# Patient Record
Sex: FEMALE | Race: BLACK OR AFRICAN AMERICAN | Employment: UNEMPLOYED | URBAN - METROPOLITAN AREA
[De-identification: names, ages, dates, MRNs, and addresses within clinical notes are randomized per-mention and may not be internally consistent; named-entity substitution may affect disease eponyms.]

---

## 2017-01-20 LAB
ANTIBODY SCREEN, EXTERNAL: NEGATIVE
CHLAMYDIA, EXTERNAL: NEGATIVE
HBSAG, EXTERNAL: NEGATIVE
HIV, EXTERNAL: NEGATIVE
N. GONORRHEA, EXTERNAL: NEGATIVE
RPR, EXTERNAL: NONREACTIVE
RUBELLA, EXTERNAL: NORMAL
TYPE, ABO & RH, EXTERNAL: NORMAL

## 2017-03-11 ENCOUNTER — APPOINTMENT (OUTPATIENT)
Dept: ULTRASOUND IMAGING | Age: 37
End: 2017-03-11
Attending: EMERGENCY MEDICINE
Payer: COMMERCIAL

## 2017-03-11 ENCOUNTER — HOSPITAL ENCOUNTER (EMERGENCY)
Age: 37
Discharge: HOME | End: 2017-03-11
Attending: EMERGENCY MEDICINE
Payer: COMMERCIAL

## 2017-03-11 VITALS
HEART RATE: 89 BPM | HEIGHT: 61 IN | RESPIRATION RATE: 17 BRPM | DIASTOLIC BLOOD PRESSURE: 60 MMHG | WEIGHT: 157 LBS | BODY MASS INDEX: 29.64 KG/M2 | OXYGEN SATURATION: 99 % | TEMPERATURE: 98.5 F | SYSTOLIC BLOOD PRESSURE: 111 MMHG

## 2017-03-11 DIAGNOSIS — N76.0 BV (BACTERIAL VAGINOSIS): Primary | ICD-10-CM

## 2017-03-11 DIAGNOSIS — B96.89 BV (BACTERIAL VAGINOSIS): Primary | ICD-10-CM

## 2017-03-11 LAB
ALBUMIN SERPL BCP-MCNC: 3 G/DL (ref 3.5–5)
ALBUMIN/GLOB SERPL: 0.7 {RATIO} (ref 1.1–2.2)
ALP SERPL-CCNC: 99 U/L (ref 45–117)
ALT SERPL-CCNC: 21 U/L (ref 12–78)
ANION GAP BLD CALC-SCNC: 9 MMOL/L (ref 5–15)
APPEARANCE UR: ABNORMAL
AST SERPL W P-5'-P-CCNC: 14 U/L (ref 15–37)
BACTERIA URNS QL MICRO: NEGATIVE /HPF
BASOPHILS # BLD AUTO: 0 K/UL (ref 0–0.1)
BASOPHILS # BLD: 0 % (ref 0–1)
BILIRUB SERPL-MCNC: 0.1 MG/DL (ref 0.2–1)
BILIRUB UR QL: NEGATIVE
BUN SERPL-MCNC: 7 MG/DL (ref 6–20)
BUN/CREAT SERPL: 14 (ref 12–20)
CALCIUM SERPL-MCNC: 9.1 MG/DL (ref 8.5–10.1)
CHLORIDE SERPL-SCNC: 103 MMOL/L (ref 97–108)
CLUE CELLS VAG QL WET PREP: NORMAL
CO2 SERPL-SCNC: 24 MMOL/L (ref 21–32)
COLOR UR: ABNORMAL
CREAT SERPL-MCNC: 0.5 MG/DL (ref 0.55–1.02)
EOSINOPHIL # BLD: 0.3 K/UL (ref 0–0.4)
EOSINOPHIL NFR BLD: 3 % (ref 0–7)
EPITH CASTS URNS QL MICRO: ABNORMAL /LPF
ERYTHROCYTE [DISTWIDTH] IN BLOOD BY AUTOMATED COUNT: 14.7 % (ref 11.5–14.5)
GLOBULIN SER CALC-MCNC: 4.3 G/DL (ref 2–4)
GLUCOSE SERPL-MCNC: 93 MG/DL (ref 65–100)
GLUCOSE UR STRIP.AUTO-MCNC: NEGATIVE MG/DL
HCG SERPL-ACNC: ABNORMAL MIU/ML (ref 0–6)
HCT VFR BLD AUTO: 38.1 % (ref 35–47)
HGB BLD-MCNC: 12.2 G/DL (ref 11.5–16)
HGB UR QL STRIP: NEGATIVE
HYALINE CASTS URNS QL MICRO: ABNORMAL /LPF (ref 0–5)
KETONES UR QL STRIP.AUTO: NEGATIVE MG/DL
KOH PREP SPEC: NORMAL
LEUKOCYTE ESTERASE UR QL STRIP.AUTO: NEGATIVE
LYMPHOCYTES # BLD AUTO: 21 % (ref 12–49)
LYMPHOCYTES # BLD: 1.6 K/UL (ref 0.8–3.5)
MCH RBC QN AUTO: 25.6 PG (ref 26–34)
MCHC RBC AUTO-ENTMCNC: 32 G/DL (ref 30–36.5)
MCV RBC AUTO: 80 FL (ref 80–99)
MONOCYTES # BLD: 0.4 K/UL (ref 0–1)
MONOCYTES NFR BLD AUTO: 5 % (ref 5–13)
NEUTS SEG # BLD: 5.6 K/UL (ref 1.8–8)
NEUTS SEG NFR BLD AUTO: 71 % (ref 32–75)
NITRITE UR QL STRIP.AUTO: NEGATIVE
PH UR STRIP: 6 [PH] (ref 5–8)
PLATELET # BLD AUTO: 289 K/UL (ref 150–400)
POTASSIUM SERPL-SCNC: 3.4 MMOL/L (ref 3.5–5.1)
PROT SERPL-MCNC: 7.3 G/DL (ref 6.4–8.2)
PROT UR STRIP-MCNC: NEGATIVE MG/DL
RBC # BLD AUTO: 4.76 M/UL (ref 3.8–5.2)
RBC #/AREA URNS HPF: ABNORMAL /HPF (ref 0–5)
SERVICE CMNT-IMP: NORMAL
SODIUM SERPL-SCNC: 136 MMOL/L (ref 136–145)
SP GR UR REFRACTOMETRY: 1.02 (ref 1–1.03)
T VAGINALIS VAG QL WET PREP: NORMAL
UROBILINOGEN UR QL STRIP.AUTO: 1 EU/DL (ref 0.2–1)
WBC # BLD AUTO: 8 K/UL (ref 3.6–11)
WBC URNS QL MICRO: ABNORMAL /HPF (ref 0–4)

## 2017-03-11 PROCEDURE — 87210 SMEAR WET MOUNT SALINE/INK: CPT | Performed by: EMERGENCY MEDICINE

## 2017-03-11 PROCEDURE — 36415 COLL VENOUS BLD VENIPUNCTURE: CPT | Performed by: EMERGENCY MEDICINE

## 2017-03-11 PROCEDURE — 84702 CHORIONIC GONADOTROPIN TEST: CPT | Performed by: EMERGENCY MEDICINE

## 2017-03-11 PROCEDURE — 81001 URINALYSIS AUTO W/SCOPE: CPT | Performed by: EMERGENCY MEDICINE

## 2017-03-11 PROCEDURE — 76802 OB US < 14 WKS ADDL FETUS: CPT

## 2017-03-11 PROCEDURE — 80053 COMPREHEN METABOLIC PANEL: CPT | Performed by: EMERGENCY MEDICINE

## 2017-03-11 PROCEDURE — 99283 EMERGENCY DEPT VISIT LOW MDM: CPT

## 2017-03-11 PROCEDURE — 85025 COMPLETE CBC W/AUTO DIFF WBC: CPT | Performed by: EMERGENCY MEDICINE

## 2017-03-11 RX ORDER — CLINDAMYCIN HYDROCHLORIDE 300 MG/1
300 CAPSULE ORAL 2 TIMES DAILY
Qty: 14 CAP | Refills: 0 | Status: SHIPPED | OUTPATIENT
Start: 2017-03-11 | End: 2017-03-18

## 2017-03-11 NOTE — ED PROVIDER NOTES
HPI Comments: 39 y.o. female with past medical history significant for headache who presents from home with chief complaint of a pregnancy problem. Pt reports she has been having lower abdominal cramping with accompanying vaginal discharge for the last 2 days, and she is concerned about her pregnancy d/t prior complications. She states she is unsure whether her discharge is amniotic fluid or urine. Pt denies contractions. There are no other acute medical concerns at this time. Note written by Austin Garber, as dictated by Junior Ko MD 1:34 PM    The history is provided by the patient. The history is limited by a developmental delay. Past Medical History:   Diagnosis Date    Headache        Past Surgical History:   Procedure Laterality Date    HX  SECTION           Family History:   Problem Relation Age of Onset    Hypertension Mother     Diabetes Maternal Aunt     Hypertension Maternal Aunt     Diabetes Paternal Aunt     Hypertension Paternal Aunt     Diabetes Paternal Grandfather        Social History     Social History    Marital status: SINGLE     Spouse name: N/A    Number of children: N/A    Years of education: N/A     Occupational History    Not on file. Social History Main Topics    Smoking status: Never Smoker    Smokeless tobacco: Never Used    Alcohol use 0.6 oz/week     1 Glasses of wine per week    Drug use: No    Sexual activity: Not Currently     Birth control/ protection: None     Other Topics Concern    Not on file     Social History Narrative         ALLERGIES: Review of patient's allergies indicates no known allergies. Review of Systems   Constitutional: Negative for chills, diaphoresis and fever. HENT: Negative for congestion, postnasal drip, rhinorrhea and sore throat. Eyes: Negative for photophobia, discharge, redness and visual disturbance. Respiratory: Negative for cough, chest tightness, shortness of breath and wheezing. Cardiovascular: Negative for chest pain, palpitations and leg swelling. Gastrointestinal: Positive for abdominal pain. Negative for abdominal distention, blood in stool, constipation, diarrhea, nausea and vomiting. Genitourinary: Positive for vaginal discharge. Negative for difficulty urinating, dysuria, frequency, hematuria and urgency. Musculoskeletal: Negative for arthralgias, back pain, joint swelling and myalgias. Skin: Negative for color change and rash. Neurological: Negative for dizziness, speech difficulty, weakness, light-headedness, numbness and headaches. Psychiatric/Behavioral: Negative for confusion. The patient is not nervous/anxious. All other systems reviewed and are negative. Vitals:    03/11/17 1236   BP: 106/68   Pulse: 100   Resp: 18   Temp: 98.4 °F (36.9 °C)   SpO2: 97%   Weight: 71.2 kg (157 lb)   Height: 5' 1\" (1.549 m)            Physical Exam   Constitutional: She is oriented to person, place, and time. She appears well-developed and well-nourished. No distress. HENT:   Head: Normocephalic and atraumatic. Right Ear: External ear normal.   Left Ear: External ear normal.   Nose: Nose normal.   Mouth/Throat: Oropharynx is clear and moist.   Eyes: Conjunctivae and EOM are normal. Pupils are equal, round, and reactive to light. No scleral icterus. Neck: Normal range of motion. Neck supple. No JVD present. No tracheal deviation present. No thyromegaly present. Cardiovascular: Normal rate, regular rhythm and normal heart sounds. Exam reveals no gallop and no friction rub. No murmur heard. Pulmonary/Chest: Effort normal and breath sounds normal. No respiratory distress. She has no wheezes. She has no rales. She exhibits no tenderness. Abdominal: Soft. Bowel sounds are normal. She exhibits no distension and no mass. There is no tenderness. There is no rebound and no guarding. Genitourinary:   Genitourinary Comments: Pelvic exam: cervix is closed.  Foul smelling white discharge present. Musculoskeletal: Normal range of motion. She exhibits no edema or tenderness. Lymphadenopathy:     She has no cervical adenopathy. Neurological: She is alert and oriented to person, place, and time. She has normal strength. She displays no atrophy and no tremor. No cranial nerve deficit. She exhibits normal muscle tone. Coordination and gait normal.   Skin: Skin is warm and dry. No rash noted. She is not diaphoretic. No erythema. Psychiatric: She has a normal mood and affect. Her behavior is normal. Judgment and thought content normal.   Nursing note and vitals reviewed. Note written by Austin Salguero, as dictated by Kassandra Durand MD 1:34 PM    MDM  Number of Diagnoses or Management Options  Diagnosis management comments: Impression: 68-year-old female proximally 14 weeks gravid with her second pregnancy presenting to the emergency department for sensation that she's been leaking amniotic fluid. There is been no fever chills abdominal pain or contractions. No vaginal discharge. Differential includes possible urine leak, urinary tract infection, doubt amniotic fluid leak, consider vaginal discharge. Plan of care B. baseline labs, pelvic ultrasound, pelvic exam and we'll send swabs for evaluation. We'll treat accordingly.     ED Course       Procedures

## 2017-03-11 NOTE — ED TRIAGE NOTES
Pt presents with complaints of cramping and discomfort in lower abdomen. Pt states 2 days ago she began to have \"leakage\" of clear fluid. Pt is concerned that this may be amniotic fluid. Pt reports spotting bright red blood 1 week ago.

## 2017-03-11 NOTE — DISCHARGE INSTRUCTIONS
We hope that we have addressed all of your medical concerns. The examination and treatment you received in the Emergency Department were for an emergent problem and were not intended as complete care. It is important that you follow up with your healthcare provider(s) for ongoing care. If your symptoms worsen or do not improve as expected, and you are unable to reach your usual health care provider(s), you should return to the Emergency Department. Today's healthcare is undergoing tremendous change, and patient satisfaction surveys are one of the many tools to assess the quality of medical care. You may receive a survey from the [employer] regarding your experience in the Emergency Department. I hope that your experience has been completely positive, particularly the medical care that I provided. As such, please participate in the survey; anything less than excellent does not meet my expectations or intentions. [facility] and [address] [place] [address] participate in nationally recognized quality of care measures. If your blood pressure is greater than 120/80, as reported below, we urge that you seek medical care to address the potential of high blood pressure, commonly known as hypertension. Hypertension can be hereditary or can be caused by certain medical conditions, pain, stress, or \"white coat syndrome. \"       Please make an appointment with your health care provider(s) for follow up of your Emergency Department visit. VITALS:   Patient Vitals for the past 8 hrs:   Temp Pulse Resp BP SpO2   03/11/17 1236 98.4 °F (36.9 °C) 100 18 106/68 97 %          Thank you for allowing us to provide you with medical care today. We realize that you have many choices for your emergency care needs. Please choose us in the future for any continued health care needs. Lisa Conklin M.D.  [facility].   Office: [phone]            Recent Results (from the past 24 hour(s))   CBC WITH AUTOMATED DIFF    Collection Time: 03/11/17  1:00 PM   Result Value Ref Range    WBC 8.0 3.6 - 11.0 K/uL    RBC 4.76 3.80 - 5.20 M/uL    HGB 12.2 11.5 - 16.0 g/dL    HCT 38.1 35.0 - 47.0 %    MCV 80.0 80.0 - 99.0 FL    MCH 25.6 (L) 26.0 - 34.0 PG    MCHC 32.0 30.0 - 36.5 g/dL    RDW 14.7 (H) 11.5 - 14.5 %    PLATELET 591 554 - 115 K/uL    NEUTROPHILS 71 32 - 75 %    LYMPHOCYTES 21 12 - 49 %    MONOCYTES 5 5 - 13 %    EOSINOPHILS 3 0 - 7 %    BASOPHILS 0 0 - 1 %    ABS. NEUTROPHILS 5.6 1.8 - 8.0 K/UL    ABS. LYMPHOCYTES 1.6 0.8 - 3.5 K/UL    ABS. MONOCYTES 0.4 0.0 - 1.0 K/UL    ABS. EOSINOPHILS 0.3 0.0 - 0.4 K/UL    ABS. BASOPHILS 0.0 0.0 - 0.1 K/UL   METABOLIC PANEL, COMPREHENSIVE    Collection Time: 03/11/17  1:00 PM   Result Value Ref Range    Sodium 136 136 - 145 mmol/L    Potassium 3.4 (L) 3.5 - 5.1 mmol/L    Chloride 103 97 - 108 mmol/L    CO2 24 21 - 32 mmol/L    Anion gap 9 5 - 15 mmol/L    Glucose 93 65 - 100 mg/dL    BUN 7 6 - 20 MG/DL    Creatinine 0.50 (L) 0.55 - 1.02 MG/DL    BUN/Creatinine ratio 14 12 - 20      GFR est AA >60 >60 ml/min/1.73m2    GFR est non-AA >60 >60 ml/min/1.73m2    Calcium 9.1 8.5 - 10.1 MG/DL    Bilirubin, total 0.1 (L) 0.2 - 1.0 MG/DL    ALT (SGPT) 21 12 - 78 U/L    AST (SGOT) 14 (L) 15 - 37 U/L    Alk. phosphatase 99 45 - 117 U/L    Protein, total 7.3 6.4 - 8.2 g/dL    Albumin 3.0 (L) 3.5 - 5.0 g/dL    Globulin 4.3 (H) 2.0 - 4.0 g/dL    A-G Ratio 0.7 (L) 1.1 - 2.2     TOTAL HCG, QT.     Collection Time: 03/11/17  1:00 PM   Result Value Ref [place] [address] (H) 0 - 6 MIU/ML   URINALYSIS W/MICROSCOPIC    Collection Time: 03/11/17  1:00 PM   Result Value Ref Range    Color YELLOW/STRAW      Appearance CLOUDY (A) CLEAR      Specific gravity 1.021 1.003 - 1.030      pH (UA) 6.0 5.0 - 8.0      Protein NEGATIVE  NEG mg/dL    Glucose NEGATIVE  NEG mg/dL    Ketone NEGATIVE NEG mg/dL    Bilirubin NEGATIVE  NEG      Blood NEGATIVE  NEG      Urobilinogen 1.0 0.2 - 1.0 EU/dL    Nitrites NEGATIVE  NEG      Leukocyte Esterase NEGATIVE  NEG      WBC 0-4 0 - 4 /hpf    RBC 0-5 0 - 5 /hpf    Epithelial cells FEW FEW /lpf    Bacteria NEGATIVE  NEG /hpf    Hyaline cast 0-2 0 - 5 /lpf   GISSELL, OTHER SOURCES    Collection Time: 03/11/17  2:10 PM   Result Value Ref Range    Special Requests: NO SPECIAL REQUESTS      KOH NO YEAST SEEN     WET PREP    Collection Time: 03/11/17  2:10 PM   Result Value Ref Range    Clue cells CLUE CELLS PRESENT      Wet prep NO TRICHOMONAS SEEN         Us Preg Uts < 14 Wks Add    Result Date: 3/11/2017  INDICATION:  leaking fluid? EXAM: PELVIC ULTRASOUND FOR EARLY PREGNANCY. COMPARISON: None. TECHNIQUE: Realtime sonography of the pelvis was performed transabdominally with multiple static images obtained. Kary Laureano FINDINGS: TRANSABDOMINAL: UTERINE SIZE IS enlarged . The cervix measures 3.9 cm in length. The examination demonstrates a single intrauterine pregnancy with gestational size parameters as follows: Biparietal diameter 2.7 cm, gestational age 12 weeks 5 days, head circumference 9.1 cm gestational age 12 weeks 1 day, abdominal circumference 8.0 cm estimated gestational age 12 weeks 3 days, femur length 1.56 cm estimated gestational age 12 weeks 4 days. . Fetal cardiac activity is identified with a fetal heart rate of 153 beats per minute. The internal cervical os is closed. AMNIOTIC FLUID volume is normal. The PLACENTA  is located posteriorly and may be near the cervix at this point. . The ovaries are not seen due to uterine enlargement and overlying bowel gas. . There is no free fluid in the cul-de-sac. IMPRESSION: Single viable 14 week 3 day intrauterine pregnancy. Amniotic fluid volume is normal.              Bacterial Vaginosis: Care Instructions  Your Care Instructions    Bacterial vaginosis is a type of vaginal infection.  It is caused by excess growth of certain bacteria that are normally found in the vagina. Symptoms can include itching, swelling, pain when you urinate or have sex, and a gray or yellow discharge with a \"fishy\" odor. It is not considered an infection that is spread through sexual contact. Although symptoms can be annoying and uncomfortable, bacterial vaginosis does not usually cause other health problems. However, if you have it while you are pregnant, it can cause complications. While the infection may go away on its own, most doctors use antibiotics to treat it. You may have been prescribed pills or vaginal cream. With treatment, bacterial vaginosis usually clears up in 5 to 7 days. Follow-up care is a key part of your treatment and safety. Be sure to make and go to all appointments, and call your doctor if you are having problems. It's also a good idea to know your test results and keep a list of the medicines you take. How can you care for yourself at home? · Take your antibiotics as directed. Do not stop taking them just because you feel better. You need to take the full course of antibiotics. · Do not eat or drink anything that contains alcohol if you are taking metronidazole (Flagyl). · Keep using your medicine if you start your period. Use pads instead of tampons while using a vaginal cream or suppository. Tampons can absorb the medicine. · Wear loose cotton clothing. Do not wear nylon and other materials that hold body heat and moisture close to the skin. · Do not scratch. Relieve itching with a cold pack or a cool bath. · Do not wash your vaginal area more than once a day. Use plain water or a mild, unscented soap. Do not douche. When should you call for help? Watch closely for changes in your health, and be sure to contact your doctor if:  · You have unexpected vaginal bleeding. · You have a fever. · You have new or increased pain in your vagina or pelvis. · You are not getting better after 1 week.   · Your symptoms return after you finish the course of your medicine. Where can you learn more? Go to http://hafsa-ary.info/. Verlene Sacks in the search box to learn more about \"Bacterial Vaginosis: Care Instructions. \"  Current as of: February 25, 2016  Content Version: 11.1  © 6022-4760 [employer]. Care instructions adapted under license by [employer] (which disclaims liability or warranty for this information). If you have questions about a medical condition or this instruction, always ask your healthcare professional. [address] any warranty or liability for your use of this information.

## 2017-06-22 ENCOUNTER — HOSPITAL ENCOUNTER (EMERGENCY)
Age: 37
Discharge: HOME | End: 2017-06-22
Attending: OBSTETRICS & GYNECOLOGY | Admitting: OBSTETRICS & GYNECOLOGY
Payer: COMMERCIAL

## 2017-06-22 ENCOUNTER — HOSPITAL ENCOUNTER (EMERGENCY)
Age: 37
Discharge: STILL ADMITTED OR ADMINISTRATIVE | End: 2017-06-22
Attending: EMERGENCY MEDICINE
Payer: COMMERCIAL

## 2017-06-22 VITALS
DIASTOLIC BLOOD PRESSURE: 61 MMHG | RESPIRATION RATE: 16 BRPM | OXYGEN SATURATION: 94 % | TEMPERATURE: 97.7 F | HEART RATE: 87 BPM | HEIGHT: 61 IN | BODY MASS INDEX: 34.55 KG/M2 | SYSTOLIC BLOOD PRESSURE: 117 MMHG | WEIGHT: 183 LBS

## 2017-06-22 LAB
ALBUMIN SERPL BCP-MCNC: 2.7 G/DL (ref 3.5–5)
ALBUMIN/GLOB SERPL: 0.7 {RATIO} (ref 1.1–2.2)
ALP SERPL-CCNC: 120 U/L (ref 45–117)
ALT SERPL-CCNC: 16 U/L (ref 12–78)
ANION GAP BLD CALC-SCNC: 11 MMOL/L (ref 5–15)
APPEARANCE UR: ABNORMAL
AST SERPL W P-5'-P-CCNC: 16 U/L (ref 15–37)
BACTERIA URNS QL MICRO: NEGATIVE /HPF
BASOPHILS # BLD AUTO: 0 K/UL (ref 0–0.1)
BASOPHILS # BLD: 0 % (ref 0–1)
BILIRUB SERPL-MCNC: 0.2 MG/DL (ref 0.2–1)
BILIRUB UR QL: NEGATIVE
BUN SERPL-MCNC: 6 MG/DL (ref 6–20)
BUN/CREAT SERPL: 12 (ref 12–20)
CALCIUM SERPL-MCNC: 9.1 MG/DL (ref 8.5–10.1)
CHLORIDE SERPL-SCNC: 107 MMOL/L (ref 97–108)
CO2 SERPL-SCNC: 24 MMOL/L (ref 21–32)
COLOR UR: ABNORMAL
CREAT SERPL-MCNC: 0.5 MG/DL (ref 0.55–1.02)
EOSINOPHIL # BLD: 0.2 K/UL (ref 0–0.4)
EOSINOPHIL NFR BLD: 2 % (ref 0–7)
EPITH CASTS URNS QL MICRO: ABNORMAL /LPF
ERYTHROCYTE [DISTWIDTH] IN BLOOD BY AUTOMATED COUNT: 15.4 % (ref 11.5–14.5)
GLOBULIN SER CALC-MCNC: 3.8 G/DL (ref 2–4)
GLUCOSE SERPL-MCNC: 95 MG/DL (ref 65–100)
GLUCOSE UR STRIP.AUTO-MCNC: NEGATIVE MG/DL
HCT VFR BLD AUTO: 34.8 % (ref 35–47)
HGB BLD-MCNC: 11.3 G/DL (ref 11.5–16)
HGB UR QL STRIP: ABNORMAL
HYALINE CASTS URNS QL MICRO: ABNORMAL /LPF (ref 0–5)
KETONES UR QL STRIP.AUTO: NEGATIVE MG/DL
LEUKOCYTE ESTERASE UR QL STRIP.AUTO: NEGATIVE
LIPASE SERPL-CCNC: 192 U/L (ref 73–393)
LYMPHOCYTES # BLD AUTO: 22 % (ref 12–49)
LYMPHOCYTES # BLD: 2.3 K/UL (ref 0.8–3.5)
MCH RBC QN AUTO: 27.2 PG (ref 26–34)
MCHC RBC AUTO-ENTMCNC: 32.5 G/DL (ref 30–36.5)
MCV RBC AUTO: 83.7 FL (ref 80–99)
MONOCYTES # BLD: 0.5 K/UL (ref 0–1)
MONOCYTES NFR BLD AUTO: 4 % (ref 5–13)
NEUTS SEG # BLD: 7.5 K/UL (ref 1.8–8)
NEUTS SEG NFR BLD AUTO: 72 % (ref 32–75)
NITRITE UR QL STRIP.AUTO: NEGATIVE
PH UR STRIP: 7 [PH] (ref 5–8)
PLATELET # BLD AUTO: 307 K/UL (ref 150–400)
POTASSIUM SERPL-SCNC: 3.6 MMOL/L (ref 3.5–5.1)
PROT SERPL-MCNC: 6.5 G/DL (ref 6.4–8.2)
PROT UR STRIP-MCNC: NEGATIVE MG/DL
RBC # BLD AUTO: 4.16 M/UL (ref 3.8–5.2)
RBC #/AREA URNS HPF: ABNORMAL /HPF (ref 0–5)
SODIUM SERPL-SCNC: 142 MMOL/L (ref 136–145)
SP GR UR REFRACTOMETRY: 1.01 (ref 1–1.03)
UA: UC IF INDICATED,UAUC: ABNORMAL
UROBILINOGEN UR QL STRIP.AUTO: 0.2 EU/DL (ref 0.2–1)
WBC # BLD AUTO: 10.5 K/UL (ref 3.6–11)
WBC URNS QL MICRO: ABNORMAL /HPF (ref 0–4)

## 2017-06-22 PROCEDURE — 36415 COLL VENOUS BLD VENIPUNCTURE: CPT | Performed by: OBSTETRICS & GYNECOLOGY

## 2017-06-22 PROCEDURE — 75810000275 HC EMERGENCY DEPT VISIT NO LEVEL OF CARE

## 2017-06-22 PROCEDURE — 85025 COMPLETE CBC W/AUTO DIFF WBC: CPT | Performed by: OBSTETRICS & GYNECOLOGY

## 2017-06-22 PROCEDURE — 99285 EMERGENCY DEPT VISIT HI MDM: CPT

## 2017-06-22 PROCEDURE — 81001 URINALYSIS AUTO W/SCOPE: CPT | Performed by: OBSTETRICS & GYNECOLOGY

## 2017-06-22 PROCEDURE — 80053 COMPREHEN METABOLIC PANEL: CPT | Performed by: OBSTETRICS & GYNECOLOGY

## 2017-06-22 PROCEDURE — 83690 ASSAY OF LIPASE: CPT | Performed by: OBSTETRICS & GYNECOLOGY

## 2017-06-22 RX ORDER — TERBUTALINE SULFATE 1 MG/ML
0.25 INJECTION SUBCUTANEOUS AS NEEDED
Status: DISCONTINUED | OUTPATIENT
Start: 2017-06-22 | End: 2017-06-23 | Stop reason: HOSPADM

## 2017-06-22 NOTE — PROGRESS NOTES
1958 Bedside and Verbal shift change report given to SKYLER Greene RN  (oncoming nurse) by Dari Huerta RN  (offgoing nurse). Report included the following information SBAR, Kardex, Procedure Summary, Intake/Output, MAR and Recent Results. 2020 Dari Huerta RN called Dr. Jose Alfredo Otto and notified her of patient's arrival. Patient to be seen by Dr. Delroy Hollins. 2115 Dr. Delroy Hollins at bedside assessing patient. Reviewed fetal monitor strip. 2130  Dr. Delroy Hollins remaining at bedside evaluating the patient. Plan to send labs. All questions addressed at this time. 2135 Labs drawn and sent to lab. 2238 Dr. Delroy Hollins at bedside discussing normal lab results. Plan to send patient home with instructions to keep scheduled appoint on 6/28 and call doctor's office if vaginal bleeding, leaking of fluid, or any change of symptoms. [phone] Discharge instructions printed, reviewed with patient and given to patient to take home. All questions addressed at this time. 2258 Patient discharged to home ambulatory with family members.

## 2017-06-22 NOTE — IP AVS SNAPSHOT
[address] 
[phone] Patient: Maico Taylor MRN: [id number] NVL:[id number] You are allergic to the following No active allergies Recent Documentation Height Weight Breastfeeding? BMI OB Status Smoking Status 1.549 m 83 kg No 34.58 kg/m2 Pregnant Never Smoker Emergency Contacts Name Discharge Info Relation Home Work Mobile Jael Saldana DISCHARGE CAREGIVER [3] Father [15] [phone] About your hospitalization You were admitted on:  N/A You last received care in the:  [facility] 3 LABOR & DELIVERY You were discharged on:  June 22, 2017 Unit phone number:  [phone] Why you were hospitalized Your primary diagnosis was:  Not on File Providers Seen During Your Hospitalizations Provider Role Specialty Primary office phone Maikol Bahena DO Attending Provider Obstetrics & Gynecology [phone] Your Primary Care Physician (PCP) Primary Care Physician Office Phone Office Fax OTHER, PHYS ** None ** ** None ** Follow-up Information Follow up With Details Comments Contact Info Maikol Bahena DO Go to scheduled appointment on 6/28/17 [facility] 201 1400 [address] [place] [address] 
[phone] Current Discharge Medication List  
  
ASK your doctor about these medications Dose & Instructions Dispensing Information Comments Morning Noon Evening Bedtime  
 cyanocobalamin 500 mcg tablet Commonly known as:  VITAMIN B12 Your last dose was: Your next dose is:    
   
   
 Dose:  500 mcg Take 500 mcg by mouth daily. Refills:  0  
     
   
   
   
  
 multivitamin tablet Commonly known as:  ONE A DAY Your last dose was: Your next dose is:    
   
   
 Dose:  1 Tab Take 1 Tab by mouth daily. Refills:  0 PNV No12-Iron-FA-DSS-OM-3 29 mg iron-1 mg -50 mg Cpkd Your last dose was: Your next dose is: Take  by mouth. Refills:  0 Discharge Instructions  DISCHARGE INSTRUCTIONS Name: Vicky Webb YOB: 1980 Primary Diagnosis: Active Problems: * No active hospital problems. * Introduction: You have visited the hospital because you thought you were in  labor. These guidelines are for your information at home to help prevent repeated problems. In general, you should remember: 
? Empty your bladder every 2-3 hours. ? Avoid breast stimulation (including showers where the water stream is on your breasts)-this can cause contractions. ? Rest means lying down. ? Contractions and cramping happen more often in evening and nighttime. ? No intercourse or sexual stimulation without asking your doctor. ? Try to arrange for help with housework and . General:  
 
None Diet/Diet Restrictions: Follow your regularly prescribed diet Physical Activity / Restrictions / Safety:  
 
* Activity at home is based on how strong your  labor has been, You should follow the following activity guidelines. Activity based on symptoms: Lie down on your side if you feel contractions or tightening in your abdomen. Discharge Instructions/ Special Treatment/ Home Care Needs:  
 
Call your provider if: 
? Uterine cramping (menstrual-like cramps, intermittent or constant ? Uterine contractions every 10-15 minutes or more frequently ? Low abdominal pressure ( pelvic pressure) ? Dull low backache (intermittent or constant) ? Increase or change in vaginal discharge ? Feeling that the baby is \"pushing down\" ? Abdominal cramping with or without diarrhea If any of these symptoms are experienced, stop what you are doing, lie down on your side, drink two to three glasses of water and wait one hour. If the symptoms persist or get worse, call your provider. Pain Management:  
 
 
 
 
Signed By: Juvenal Bennett RN                                                                                                   Date: 6/22/2017 Time: 10:40 PM 
 
Discharge Checklist-NURSING TO COMPLETE:  
 
Date and Time of Discharge: Date: 6/22/2017 Time: 10:40 PM 
 
Return of:  
Dental Appliance: Dental Appliances: None Vision: Visual Aid: None Hearing Aid:   
Jewelry: Jewelry: None Clothing: Clothing: At bedside Other Valuables: Other Valuables: Cell Phone, Silvio Quinn, [address] Valuables sent to safe: Personal Items Sent to Safe: none Prescription Given: no 
Medication Instruction Sheet(s), including side effects, provided: no 
 
Accompanied By: Family and Friend Mode of Transportation: 
 
Discharge Disposition: Home I have had the opportunity to make my options or choices for discharge. I have received and understand these instructions. These are general instructions for a healthy lifestyle: No smoking/ No tobacco products/ Avoid exposure to second hand smoke Surgeon General's Warning:  Quitting smoking now greatly reduces serious risk to your health. Obesity, smoking, and sedentary lifestyle greatly increases your risk for illness A healthy diet, regular physical exercise & weight monitoring are important for maintaining a healthy lifestyle Recognize signs and symptoms of STROKE: 
 
F-face looks uneven A-arms unable to move or move unevenly S-speech slurred or non-existent T-time-call 911 as soon as signs and symptoms begin-DO NOT go Back to bed or wait to see if you get better-TIME IS BRAIN. Discharge Orders None Introducing [facility]! Marj Bass introduces [facility] patient portal. Now you can access parts of your medical record, email your doctor's office, and request medication refills online.    
 
1. In your internet browser, go to [email or web address] 2. Click on the First Time User? Click Here link in the Sign In box. You will see the New Member Sign Up page. 3. Enter your Z2 Access Code exactly as it appears below. You will not need to use this code after youve completed the sign-up process. If you do not sign up before the expiration date, you must request a new code. · Z2 Access Code: [email or web address] Expires: 9/20/2017 10:46 PM 
 
4. Enter the last four digits of your Social Security Number (xxxx) and Date of Birth (mm/dd/yyyy) as indicated and click Submit. You will be taken to the next sign-up page. 5. Create a Z2 ID. This will be your Z2 login ID and cannot be changed, so think of one that is secure and easy to remember. 6. Create a Z2 password. You can change your password at any time. 7. Enter your Password Reset Question and Answer. This can be used at a later time if you forget your password. 8. Enter your e-mail address. You will receive e-mail notification when new information is available in [address] 9. Click Sign Up. You can now view and download portions of your medical record. 10. Click the Download Summary menu link to download a portable copy of your medical information. If you have questions, please visit the Frequently Asked Questions section of the Z2 website. Remember, Z2 is NOT to be used for urgent needs. For medical emergencies, dial 911. Now available from your iPhone and Android! General Information Please provide this summary of care documentation to your next provider. Patient Signature:  ____________________________________________________________ Date:  ____________________________________________________________  
  
Neita Hidden Provider Signature:  ____________________________________________________________ Date:  ____________________________________________________________

## 2017-06-23 NOTE — DISCHARGE INSTRUCTIONS
[address] INSTRUCTIONS    Name: Cruzito Cronin  YOB: 1980  Primary Diagnosis: Active Problems:    * No active hospital problems. *      Introduction: You have visited the hospital because you thought you were in  labor. These guidelines are for your information at home to help prevent repeated problems. In general, you should remember:   Empty your bladder every 2-3 hours.  Avoid breast stimulation (including showers where the water stream is on your breasts)-this can cause contractions.  Rest means lying down.  Contractions and cramping happen more often in evening and nighttime.  No intercourse or sexual stimulation without asking your doctor.  Try to arrange for help with housework and . General:     None      Diet/Diet Restrictions: Follow your regularly prescribed diet    Physical Activity / Restrictions / Safety:     * Activity at home is based on how strong your  labor has been, You should follow the following activity guidelines. Activity based on symptoms: Lie down on your side if you feel contractions or tightening in your abdomen. Discharge Instructions/ Special Treatment/ Home Care Needs:     Call your provider if:   Uterine cramping (menstrual-like cramps, intermittent or constant   Uterine contractions every 10-15 minutes or more frequently   Low abdominal pressure ( pelvic pressure)   Dull low backache (intermittent or constant)   Increase or change in vaginal discharge   Feeling that the baby is \"pushing down\"   Abdominal cramping with or without diarrhea  If any of these symptoms are experienced, stop what you are doing, lie down on your side, drink two to three glasses of water and wait one hour. If the symptoms persist or get worse, call your provider.     Pain Management:           Signed By: Yanna Cohen RN Date: 6/22/2017 Time: 10:40 PM    Discharge Checklist-NURSING TO COMPLETE:     Date and Time of Discharge: Date: 6/22/2017 Time: 10:40 PM    Return of:   Dental Appliance: Dental Appliances: None  Vision: Visual Aid: None  Hearing Aid:    Jewelry: Jewelry: None  Clothing: Clothing: At bedside  Other Valuables: Other Valuables: Cell Phone, [employer], [address] sent to safe: Personal Items Sent to Safe: none    Prescription Given: no  Medication Instruction Sheet(s), including side effects, provided: no    Accompanied By: Family and Friend    Mode of Transportation:    Discharge Disposition: Home    I have had the opportunity to make my options or choices for discharge. I have received and understand these instructions. These are general instructions for a healthy lifestyle:    No smoking/ No tobacco products/ Avoid exposure to second hand smoke    Surgeon General's Warning:  Quitting smoking now greatly reduces serious risk to your health. Obesity, smoking, and sedentary lifestyle greatly increases your risk for illness    A healthy diet, regular physical exercise & weight monitoring are important for maintaining a healthy lifestyle    Recognize signs and symptoms of STROKE:    F-face looks uneven    A-arms unable to move or move unevenly    S-speech slurred or non-existent    T-time-call 911 as soon as signs and symptoms begin-DO NOT go       Back to bed or wait to see if you get better-TIME IS BRAIN.

## 2017-06-23 NOTE — H&P
Labor and Delivery Admission Note  2017    39 y.o.  female, G2 P 1 @ 29 0/7 wks with Estimated Date of Delivery: 17 by dates and US presents at 46 with report of intermittent bouts of nausea/vomiting. She had one episode on , a couple more episodes on Monday, none on Tuesday and Wednesday or today until this evening. These episodes don't usually coincide with PO intake and she has had normal appetite. She states that she has had some right sided headaches, feels \"hot\" but has not noted any fevers, denies any visual disturbances, RUQ/epigastric pain, had swelling a few weeks ago but none now. She denies any known sick contacts. She has no diarrhea or urinary symptoms except increased volume/flow. She has had good FM but feels like she has had less activity today. She does note some Hugo Dow contractions very irregularly. This pregnancy has been supervised by Dr. Taco Dejesus.     PNC: Blood type: A            RH: pos            Rubella: immune            VDRL: non reactive            HBsAg: negative            HIV: negative            GC/Chlamydia: negative            SVII serology: positive history             1 hr GTT: 95            GBS status: unknown    Past Medical History:   Diagnosis Date    Abnormal Papanicolaou smear of cervix     Genital herpes     last outbreak 3-4 weeks ago    Headache     Other unknown and unspecified cause of morbidity or mortality     Migraine    Psychiatric problem     depression during this pregnancy     Past Surgical History:   Procedure Laterality Date     DELIVERY ONLY          HX  SECTION       OB/GYN: G1 Primary c/s 13 years ago; VFI 6+#  Meds:   Current Facility-Administered Medications   Medication Dose Route Frequency    terbutaline (BRETHINE) injection 0.25 mg  0.25 mg SubCUTAneous PRN     Allergies: No Known Allergies  Pertinent ROS: as per HPI   Family History   Problem Relation Age of Onset    Hypertension Mother    Tito Jara Diabetes Maternal Aunt     Hypertension Maternal Aunt     Diabetes Paternal Aunt     Hypertension Paternal Aunt     Diabetes Paternal Grandfather      Social History     Social History    Marital status: SINGLE     Spouse name: N/A    Number of children: N/A    Years of education: N/A     Occupational History    Not on file. Social History Main Topics    Smoking status: Never Smoker    Smokeless tobacco: Never Used    Alcohol use 0.6 oz/week     1 Glasses of wine per week    Drug use: No    Sexual activity: Not Currently     Birth control/ protection: None     Other Topics Concern    Not on file     Social History Narrative       OBJECTIVE:  Gravid  female NAD  Temp (24hrs), Av.8 °F (37.1 °C), Min:98.8 °F (37.1 °C), Max:98.8 °F (37.1 °C)    Visit Vitals    /81 (BP 1 Location: Left arm, BP Patient Position: Sitting)    Pulse 98    Temp 98.8 °F (37.1 °C)    Resp 16    Ht 5' 1\" (1.549 m)    Wt 83 kg (183 lb)    SpO2 94%    Breastfeeding No    BMI 34.58 kg/m2       Labs:    Lab Results   Component Value Date/Time    WBC 8.0 2017 01:00 PM       Exam:  HEENT:  normal   Lungs:  clear  Cor:  RRR  Abdomen:  Obese, soft, gravid, nontender, normal bowel sounds  Fetal heart rate tracing:  Baseline 150, moderate variability, decels absent  Contraction pattern: none  Cervix:  Deferred  Fluid:  intact  Ext symmetric, nontender, 2+ patellar reflexes bilaterally    Impression:  IUP at 29 0/7 weeks with intermittent bouts of nausea & vomiting since .     Plan: Check labs: CBC/CMP/UA/lipase   --afebrile here & clinically looks well so low likelihood of AFL of pregnancy   --normal BPs & negative ROS o/w therefore preE less likely           Fetal monitoring              Shirley Granados MD

## 2017-07-17 ENCOUNTER — HOSPITAL ENCOUNTER (OUTPATIENT)
Age: 37
Discharge: HOME | End: 2017-07-18
Attending: OBSTETRICS & GYNECOLOGY | Admitting: OBSTETRICS & GYNECOLOGY
Payer: COMMERCIAL

## 2017-07-17 ENCOUNTER — HOSPITAL ENCOUNTER (EMERGENCY)
Age: 37
Discharge: STILL ADMITTED OR ADMINISTRATIVE | End: 2017-07-17
Attending: EMERGENCY MEDICINE
Payer: COMMERCIAL

## 2017-07-17 VITALS
SYSTOLIC BLOOD PRESSURE: 123 MMHG | BODY MASS INDEX: 35.68 KG/M2 | HEART RATE: 78 BPM | WEIGHT: 189 LBS | DIASTOLIC BLOOD PRESSURE: 50 MMHG | HEIGHT: 61 IN | TEMPERATURE: 98.1 F | RESPIRATION RATE: 14 BRPM

## 2017-07-17 PROCEDURE — 99284 EMERGENCY DEPT VISIT MOD MDM: CPT

## 2017-07-17 PROCEDURE — 75810000275 HC EMERGENCY DEPT VISIT NO LEVEL OF CARE

## 2017-07-17 NOTE — IP AVS SNAPSHOT
[address] 
[phone] Patient: Kimani Garcia MRN: [id number] KTL:[id number] Current Discharge Medication List  
  
ASK your doctor about these medications Dose & Instructions Dispensing Information Comments Morning Noon Evening Bedtime  
 cyanocobalamin 500 mcg tablet Commonly known as:  VITAMIN B12 Your last dose was: Your next dose is:    
   
   
 Dose:  500 mcg Take 500 mcg by mouth daily. Refills:  0  
     
   
   
   
  
 multivitamin tablet Commonly known as:  ONE A DAY Your last dose was: Your next dose is:    
   
   
 Dose:  1 Tab Take 1 Tab by mouth daily. Refills:  0  
     
   
   
   
  
 PNV No12-Iron-FA-DSS-OM-3 29 mg iron-1 mg -50 mg Cpkd Your last dose was: Your next dose is: Take  by mouth. Refills:  0

## 2017-07-17 NOTE — IP AVS SNAPSHOT
Summary of Care Report The Summary of Care report has been created to help improve care coordination. Users with access to [employer] or [address] (Web-based application) may access additional patient information including the Discharge Summary. If you are not currently a [address] user and need more information, please call the number listed below in the [place] [address] section and ask to be connected with Medical Records. Facility Information Name Address Phone [address] [id number] [phone] Patient Information Patient Name Sex  Elder Flores ([id number]) Female 1980 Discharge Information Admitting Provider Service Area Unit Gal Read MD / [facility] 3 Labor & Delivery / [phone] Discharge Provider Discharge Date/Time Discharge Disposition Destination (none) (none) (none) (none) Patient Language Language ENGLISH [13] Hospital Problems as of 2017  Reviewed: 2016 10:21 AM by Paula Jordan NP None Non-Hospital Problems as of 2017  Reviewed: 2016 10:21 AM by Paula Jordan NP None You are allergic to the following No active allergies Current Discharge Medication List  
  
ASK your doctor about these medications Dose & Instructions Dispensing Information Comments  
 cyanocobalamin 500 mcg tablet Commonly known as:  VITAMIN B12 Dose:  500 mcg Take 500 mcg by mouth daily. Refills:  0  
   
 multivitamin tablet Commonly known as:  ONE A DAY Dose:  1 Tab Take 1 Tab by mouth daily. Refills:  0  
   
 PNV No12-Iron-FA-DSS-OM-3 29 mg iron-1 mg -50 mg Cpkd Take  by mouth. Refills:  0 Current Immunizations Name Date  
 TB Skin Test (PPD) Intradermal 2016 Surgery Information ID Date/Time Status Primary Surgeon All Procedures Location [id number] 2017 0800 Scheduled Mary Monae, DO  SECTION [facility] L&D OR    
  SECTION:  [facility]  Follow-up Information None Discharge Instructions  DISCHARGE INSTRUCTIONS Name: Chau Argueta YOB: 1980 Primary Diagnosis: Active Problems: * No active hospital problems. * Introduction: You have visited the hospital because you thought you were in  labor. These guidelines are for your information at home to help prevent repeated problems. In general, you should remember: 
? Empty your bladder every 2-3 hours. ? Avoid breast stimulation (including showers where the water stream is on your breasts)-this can cause contractions. ? Rest means lying down. ? Contractions and cramping happen more often in evening and nighttime. ? No intercourse or sexual stimulation without asking your doctor. ? Try to arrange for help with housework and . General:  
 
 
 
Diet/Diet Restrictions:   
 
Anything you like/tolerate Physical Activity / Restrictions / Safety:  
 
* Activity at home is based on how strong your  labor has been, You should follow the following activity guidelines. Activity based on symptoms: Lie down on your side if you feel contractions or tightening in your abdomen. Discharge Instructions/ Special Treatment/ Home Care Needs:  
 
Call your provider if: 
? Uterine cramping (menstrual-like cramps, intermittent or constant ? Uterine contractions every 10-15 minutes or more frequently ? Low abdominal pressure ( pelvic pressure) ? Dull low backache (intermittent or constant) ? Increase or change in vaginal discharge ? Feeling that the baby is \"pushing down\" ? Abdominal cramping with or without diarrhea If any of these symptoms are experienced, stop what you are doing, lie down on your side, drink two to three glasses of water and wait one hour. If the symptoms persist or get worse, call your provider. Pain Management:  
 
 
 
 
Signed By: Angelo Clark RN                                                                                                   Date: 7/18/2017 Time: 7:13 AM 
 
Discharge Checklist-NURSING TO COMPLETE:  
 
Date and Time of Discharge: Date: 7/18/2017 Time: 7:13 AM 
 
Return of:  
Dental Appliance: Dental Appliances: None Vision: Visual Aid: None Hearing Aid:  None Jewelry: JewelryNiles Levi Clothing: Clothing: At bedside Other Valuables: Other Valuables: Cell Phone Valuables sent to safe:   
 
Prescription Given: no 
Medication Instruction Sheet(s), including side effects, provided: no 
 
Accompanied By: Family Mode of Transportation: 
 
Discharge Disposition: Home I have had the opportunity to make my options or choices for discharge. I have received and understand these instructions. These are general instructions for a healthy lifestyle: No smoking/ No tobacco products/ Avoid exposure to second hand smoke Surgeon General's Warning:  Quitting smoking now greatly reduces serious risk to your health. Obesity, smoking, and sedentary lifestyle greatly increases your risk for illness A healthy diet, regular physical exercise & weight monitoring are important for maintaining a healthy lifestyle Recognize signs and symptoms of STROKE: 
 
F-face looks uneven A-arms unable to move or move unevenly S-speech slurred or non-existent T-time-call 911 as soon as signs and symptoms begin-DO NOT go Back to bed or wait to see if you get better-TIME IS BRAIN. [employer] Activation Thank you for requesting access to [employer]. Please follow the instructions below to securely access and download your online medical record.  [employer] allows you to send messages to your doctor, view your test results, renew your prescriptions, schedule appointments, and more. How Do I Sign Up? 1. In your internet browser, go to [email or web address] 
2. Click on the First Time User? Click Here link in the Sign In box. You will be redirect to the New Member Sign Up page. 3. Enter your [employer] Access Code exactly as it appears below. You will not need to use this code after youve completed the sign-up process. If you do not sign up before the expiration date, you must request a new code. [employer] Access Code: [email or web address] Expires: 2017 10:46 PM (This is the date your [employer] access code will ) 4. Enter the last four digits of your Social Security Number (xxxx) and Date of Birth (mm/dd/yyyy) as indicated and click Submit. You will be taken to the next sign-up page. 5. Create a [employer] ID. This will be your [employer] login ID and cannot be changed, so think of one that is secure and easy to remember. 6. Create a [employer] password. You can change your password at any time. 7. Enter your Password Reset Question and Answer. This can be used at a later time if you forget your password. 8. Enter your e-mail address. You will receive e-mail notification when new information is available in [address] 9. Click Sign Up. You can now view and download portions of your medical record. 10. Click the Download Summary menu link to download a portable copy of your medical information. Additional Information If you have questions, please visit the Frequently Asked Questions section of the [employer] website at [email or web address] Remember, [employer] is NOT to be used for urgent needs. For medical emergencies, dial 911. Chart Review Routing History No Routing History on File

## 2017-07-17 NOTE — IP AVS SNAPSHOT
[address] 
[phone] Patient: Clitn Cutler MRN: [id number] TMJ: You are allergic to the following No active allergies Recent Documentation Height Weight Breastfeeding? BMI OB Status Smoking Status 1.549 m 85.7 kg No 35.71 kg/m2 Pregnant Never Smoker Emergency Contacts Name Discharge Info Relation Home Work Mobile Jael Saldana DISCHARGE CAREGIVER [3] Father [15] [phone] About your hospitalization You were admitted on:  2017 You last received care in the:  [facility] 3 LABOR & DELIVERY You were discharged on:  2017 Unit phone number:  [phone] Why you were hospitalized Your primary diagnosis was:  Not on File Providers Seen During Your Hospitalizations Provider Role Specialty Primary office phone Fabian Carmona DO Attending Provider Obstetrics & Gynecology [phone] Your Primary Care Physician (PCP) Primary Care Physician Office Phone Office Fax OTHER, PHYS ** None ** ** None ** Follow-up Information None Your Appointments 2017  8:00 AM EDT  
L&D PAT with [facility] L&D PAT  
[facility] LD PAT SHADOW ([address]) [address]  
[phone]   SECTION with Melida Henry DO  
[facility] 3 LABOR & DELIVERY (--)  
 [address]  
[phone] Current Discharge Medication List  
  
ASK your doctor about these medications Dose & Instructions Dispensing Information Comments Morning Noon Evening Bedtime  
 cyanocobalamin 500 mcg tablet Commonly known as:  VITAMIN B12 Your last dose was: Your next dose is:    
   
   
 Dose:  500 mcg Take 500 mcg by mouth daily. Refills:  0  
     
   
   
   
  
 multivitamin tablet Commonly known as:  ONE A DAY Your last dose was: Your next dose is:    
   
   
 Dose:  1 Tab Take 1 Tab by mouth daily. Refills:  0  
     
   
   
   
  
 PNV No12-Iron-FA-DSS-OM-3 29 mg iron-1 mg -50 mg Cpkd Your last dose was: Your next dose is: Take  by mouth. Refills:  0 Discharge Instructions  DISCHARGE INSTRUCTIONS Name: Isaac Keith YOB: 1980 Primary Diagnosis: Active Problems: * No active hospital problems. * Introduction: You have visited the hospital because you thought you were in  labor. These guidelines are for your information at home to help prevent repeated problems. In general, you should remember: 
? Empty your bladder every 2-3 hours. ? Avoid breast stimulation (including showers where the water stream is on your breasts)-this can cause contractions. ? Rest means lying down. ? Contractions and cramping happen more often in evening and nighttime. ? No intercourse or sexual stimulation without asking your doctor. ? Try to arrange for help with housework and . General:  
 
 
 
Diet/Diet Restrictions:   
 
Anything you like/tolerate Physical Activity / Restrictions / Safety:  
 
* Activity at home is based on how strong your  labor has been, You should follow the following activity guidelines. Activity based on symptoms: Lie down on your side if you feel contractions or tightening in your abdomen. Discharge Instructions/ Special Treatment/ Home Care Needs:  
 
Call your provider if: 
? Uterine cramping (menstrual-like cramps, intermittent or constant ? Uterine contractions every 10-15 minutes or more frequently ? Low abdominal pressure ( pelvic pressure) ? Dull low backache (intermittent or constant) ? Increase or change in vaginal discharge ? Feeling that the baby is \"pushing down\" ? Abdominal cramping with or without diarrhea If any of these symptoms are experienced, stop what you are doing, lie down on your side, drink two to three glasses of water and wait one hour. If the symptoms persist or get worse, call your provider. Pain Management:  
 
 
 
 
Signed By: Lawayne Denver, RN                                                                                                   Date: 7/18/2017 Time: 7:13 AM 
 
Discharge Checklist-NURSING TO COMPLETE:  
 
Date and Time of Discharge: Date: 7/18/2017 Time: 7:13 AM 
 
Return of:  
Dental Appliance: Dental Appliances: None Vision: Visual Aid: None Hearing Aid:  None Jewelry: JewelryBurke Martini Clothing: Clothing: At bedside Other Valuables: Other Valuables: Cell Phone Valuables sent to safe:   
 
Prescription Given: no 
Medication Instruction Sheet(s), including side effects, provided: no 
 
Accompanied By: Family Mode of Transportation: 
 
Discharge Disposition: Home I have had the opportunity to make my options or choices for discharge. I have received and understand these instructions. These are general instructions for a healthy lifestyle: No smoking/ No tobacco products/ Avoid exposure to second hand smoke Surgeon General's Warning:  Quitting smoking now greatly reduces serious risk to your health. Obesity, smoking, and sedentary lifestyle greatly increases your risk for illness A healthy diet, regular physical exercise & weight monitoring are important for maintaining a healthy lifestyle Recognize signs and symptoms of STROKE: 
 
F-face looks uneven A-arms unable to move or move unevenly S-speech slurred or non-existent T-time-call 911 as soon as signs and symptoms begin-DO NOT go Back to bed or wait to see if you get better-TIME IS BRAIN. Schematic Labshart Activation Thank you for requesting access to [employer].  Please follow the instructions below to securely access and download your online medical record. [employer] allows you to send messages to your doctor, view your test results, renew your prescriptions, schedule appointments, and more. How Do I Sign Up? 1. In your internet browser, go to [email or web address] 
2. Click on the First Time User? Click Here link in the Sign In box. You will be redirect to the New Member Sign Up page. 3. Enter your SportCentral Access Code exactly as it appears below. You will not need to use this code after youve completed the sign-up process. If you do not sign up before the expiration date, you must request a new code. [employer] Access Code: [email or web address] Expires: 2017 10:46 PM (This is the date your [employer]Central access code will ) 4. Enter the last four digits of your Social Security Number (xxxx) and Date of Birth (mm/dd/yyyy) as indicated and click Submit. You will be taken to the next sign-up page. 5. Create a [employer]Central ID. This will be your SportCentral login ID and cannot be changed, so think of one that is secure and easy to remember. 6. Create a [employer]Central password. You can change your password at any time. 7. Enter your Password Reset Question and Answer. This can be used at a later time if you forget your password. 8. Enter your e-mail address. You will receive e-mail notification when new information is available in [address] 9. Click Sign Up. You can now view and download portions of your medical record. 10. Click the Download Summary menu link to download a portable copy of your medical information. Additional Information If you have questions, please visit the Frequently Asked Questions section of the [employer]Central website at [email or web address] Remember, [employer]Central is NOT to be used for urgent needs. For medical emergencies, dial 911. Discharge Orders None Introducing [facility]!    
 [facility] introduces [employer]Central patient portal. Now you can access parts of your medical record, email your doctor's office, and request medication refills online. 1. In your internet browser, go to [email or web address] 2. Click on the First Time User? Click Here link in the Sign In box. You will see the New Member Sign Up page. 3. Enter your ByteLight Access Code exactly as it appears below. You will not need to use this code after youve completed the sign-up process. If you do not sign up before the expiration date, you must request a new code. · ByteLight Access Code: [email or web address] Expires: 9/20/2017 10:46 PM 
 
4. Enter the last four digits of your Social Security Number (xxxx) and Date of Birth (mm/dd/yyyy) as indicated and click Submit. You will be taken to the next sign-up page. 5. Create a ByteLight ID. This will be your ByteLight login ID and cannot be changed, so think of one that is secure and easy to remember. 6. Create a ByteLight password. You can change your password at any time. 7. Enter your Password Reset Question and Answer. This can be used at a later time if you forget your password. 8. Enter your e-mail address. You will receive e-mail notification when new information is available in [address] 9. Click Sign Up. You can now view and download portions of your medical record. 10. Click the Download Summary menu link to download a portable copy of your medical information. If you have questions, please visit the Frequently Asked Questions section of the ByteLight website. Remember, ByteLight is NOT to be used for urgent needs. For medical emergencies, dial 911. Now available from your iPhone and Android! General Information Please provide this summary of care documentation to your next provider. Patient Signature:  ____________________________________________________________ Date:  ____________________________________________________________  
  
Chan Brake  Provider Signature: ____________________________________________________________ Date:  ____________________________________________________________

## 2017-07-18 LAB
APPEARANCE UR: ABNORMAL
BACTERIA URNS QL MICRO: NEGATIVE /HPF
BILIRUB UR QL: NEGATIVE
COLOR UR: ABNORMAL
EPITH CASTS URNS QL MICRO: ABNORMAL /LPF
GLUCOSE UR STRIP.AUTO-MCNC: NEGATIVE MG/DL
HGB UR QL STRIP: ABNORMAL
HYALINE CASTS URNS QL MICRO: ABNORMAL /LPF (ref 0–5)
KETONES UR QL STRIP.AUTO: NEGATIVE MG/DL
LEUKOCYTE ESTERASE UR QL STRIP.AUTO: ABNORMAL
NITRITE UR QL STRIP.AUTO: NEGATIVE
PH UR STRIP: 7 [PH] (ref 5–8)
PROT UR STRIP-MCNC: NEGATIVE MG/DL
RBC #/AREA URNS HPF: ABNORMAL /HPF (ref 0–5)
SP GR UR REFRACTOMETRY: 1.01 (ref 1–1.03)
UA: UC IF INDICATED,UAUC: ABNORMAL
UROBILINOGEN UR QL STRIP.AUTO: 0.2 EU/DL (ref 0.2–1)
WBC URNS QL MICRO: ABNORMAL /HPF (ref 0–4)

## 2017-07-18 PROCEDURE — 96361 HYDRATE IV INFUSION ADD-ON: CPT

## 2017-07-18 PROCEDURE — 81001 URINALYSIS AUTO W/SCOPE: CPT | Performed by: OBSTETRICS & GYNECOLOGY

## 2017-07-18 PROCEDURE — 96360 HYDRATION IV INFUSION INIT: CPT

## 2017-07-18 PROCEDURE — 74011250636 HC RX REV CODE- 250/636: Performed by: OBSTETRICS & GYNECOLOGY

## 2017-07-18 RX ADMIN — SODIUM CHLORIDE, SODIUM LACTATE, POTASSIUM CHLORIDE, AND CALCIUM CHLORIDE 1000 ML: 600; 310; 30; 20 INJECTION, SOLUTION INTRAVENOUS at 02:27

## 2017-07-18 NOTE — H&P
Labor and Delivery Admission Note  2017    Patient is a 39 y.o.  at 32w4d who presents reporting irregular cramps this evening. She states that over the weekend she experienced some loose stools and vomiting. This morning she vomited twice but then has been able to tolerate food and liquids this afternoon and evening. She denies fever, chills, vaginal bleeding, leaking fluid, back pain, or pain with urination. She was seen in triage in  following several episodes of vomiting over a one week period; CBC, lipase, and CMP were checked at that time and were normal.  She has felt occasional movement from her baby this evening but possibly less than usual.  Pregnancy notable for history of previous  section and by h/o HSV; patient is planning to have a repeat  at 39 weeks. PNC: Blood type: A          RH: pos            Hep B: neg            Rubella: immune            GBS status: unknown    OBHX:  Term LTCS x 1 (breech, HSV)  OB History      Para Term  AB Living    2 1    1    SAB TAB Ectopic Molar Multiple Live Births                   PMH:   Past Medical History:   Diagnosis Date    Abnormal Papanicolaou smear of cervix     Genital herpes     last outbreak 3-4 weeks ago    Headache     Migraine    Psychiatric problem     depression during this pregnancy       PSH:   Past Surgical History:   Procedure Laterality Date     DELIVERY ONLY             OB/GYN: Sahra Zambrano    Meds:   Prior to Admission Medications   Prescriptions Last Dose Informant Patient Reported? Taking? PNV No12-Iron-FA-DSS-OM-3 29 mg iron-1 mg -50 mg CPKD   Yes No   Sig: Take  by mouth.   cyanocobalamin (VITAMIN B12) 500 mcg tablet   Yes No   Sig: Take 500 mcg by mouth daily. multivitamin (ONE A DAY) tablet   Yes No   Sig: Take 1 Tab by mouth daily.       Facility-Administered Medications: None       Allergies: No Known Allergies    Pertinent ROS:   General ROS: negative  Respiratory ROS: negative  Cardiovascular ROS: negative  Gastrointestinal ROS: positive for diarrhea, nausea/vomiting and cramping  Genito-Urinary ROS: no dysuria, trouble voiding, or hematuria  Neurological ROS: negative    FMH:   Family History   Problem Relation Age of Onset    Hypertension Mother     Diabetes Maternal Aunt     Hypertension Maternal Aunt     Diabetes Paternal Aunt     Hypertension Paternal Aunt     Diabetes Paternal Grandfather        SH:   Social History     Social History    Marital status: SINGLE     Spouse name: N/A    Number of children: N/A    Years of education: N/A     Social History Main Topics    Smoking status: Never Smoker    Smokeless tobacco: Never Used    Alcohol use 0.6 oz/week     1 Glasses of wine per week    Drug use: No    Sexual activity: Not Currently     Birth control/ protection: None         OBJECTIVE:    Temp (24hrs), Av.1 °F (36.7 °C), Min:98.1 °F (36.7 °C), Max:98.1 °F (36.7 °C)    Visit Vitals    /50 (BP 1 Location: Left arm, BP Patient Position: Sitting)    Pulse 78    Temp 98.1 °F (36.7 °C)    Resp 14       FHR baseline 140, moderate variability, + accel, no decels thus far  [place] CTX q 6-9 min    Exam:  General: alert  HEENT:  normal   Abdomen:  soft, non-tender  Back: normal, no CVA tenderness  Cervix:  closed, thick  Fluid: none seen  Extremities:  normal, no edema      Impression:  at 32w4d with recent vomiting and diarrhea; irregular contractions without cervical change. Suspect mild dehydration. Plan:   1000 cc LR bolus; check urinalysis for SG/signs of infection; monitor. If contractions resolve with hydration and fetal status remains reassuring, will likely be able to discharge home.     Rachel Sutton MD  11:12 PM

## 2017-07-18 NOTE — PROGRESS NOTES
Labor Progress Note  Patient seen, fetal heart rate and contraction pattern evaluated, patient examined. Pt asking for breakfast.  No data found. Physical Exam:  Cervical Exam:   / / / closed/ thick/ high  Uterine Activity: Frequency: Every 10 minutes and Intensity: mild  Fetal Heart Rate: Reactive    Assessment/Plan:  Reassuring fetal status. No change in cervix. Contractions much less frequent after IVF. Will discharge pt home. Has f/u on Monday in office with Dr. Sherron James.     Corrie Simmons DO

## 2017-07-18 NOTE — DISCHARGE INSTRUCTIONS
[address] INSTRUCTIONS    Name: Herman Gill  YOB: 1980  Primary Diagnosis: Active Problems:    * No active hospital problems. *      Introduction: You have visited the hospital because you thought you were in  labor. These guidelines are for your information at home to help prevent repeated problems. In general, you should remember:   Empty your bladder every 2-3 hours.  Avoid breast stimulation (including showers where the water stream is on your breasts)-this can cause contractions.  Rest means lying down.  Contractions and cramping happen more often in evening and nighttime.  No intercourse or sexual stimulation without asking your doctor.  Try to arrange for help with housework and . General:         Diet/Diet Restrictions:      Anything you like/tolerate    Physical Activity / Restrictions / Safety:     * Activity at home is based on how strong your  labor has been, You should follow the following activity guidelines. Activity based on symptoms: Lie down on your side if you feel contractions or tightening in your abdomen. Discharge Instructions/ Special Treatment/ Home Care Needs:     Call your provider if:   Uterine cramping (menstrual-like cramps, intermittent or constant   Uterine contractions every 10-15 minutes or more frequently   Low abdominal pressure ( pelvic pressure)   Dull low backache (intermittent or constant)   Increase or change in vaginal discharge   Feeling that the baby is \"pushing down\"   Abdominal cramping with or without diarrhea  If any of these symptoms are experienced, stop what you are doing, lie down on your side, drink two to three glasses of water and wait one hour. If the symptoms persist or get worse, call your provider.     Pain Management:           Signed By: Conner Laguerre RN                                                                                                   Date: 7/18/2017 Time: 7:13 AM    Discharge Checklist-NURSING TO COMPLETE:     Date and Time of Discharge: Date: 7/18/2017 Time: 7:13 AM    Return of:   Dental Appliance: Dental Appliances: None  Vision: Visual Aid: None  Hearing Aid:  None  Jewelry: Jewelry: Earrings  Clothing: Clothing: At bedside  Other Valuables: Other Valuables: Cell Phone  Valuables sent to safe:      Prescription Given: no  Medication Instruction Sheet(s), including side effects, provided: no    Accompanied By: Family    Mode of Transportation:    Discharge Disposition: Home    I have had the opportunity to make my options or choices for discharge. I have received and understand these instructions. These are general instructions for a healthy lifestyle:    No smoking/ No tobacco products/ Avoid exposure to second hand smoke    Surgeon General's Warning:  Quitting smoking now greatly reduces serious risk to your health. Obesity, smoking, and sedentary lifestyle greatly increases your risk for illness    A healthy diet, regular physical exercise & weight monitoring are important for maintaining a healthy lifestyle    Recognize signs and symptoms of STROKE:    F-face looks uneven    A-arms unable to move or move unevenly    S-speech slurred or non-existent    T-time-call 911 as soon as signs and symptoms begin-DO NOT go       Back to bed or wait to see if you get better-TIME IS BRAIN. Trendy Mondays Activation    Thank you for requesting access to T[employer]. Please follow the instructions below to securely access and download your online medical record. Trendy Mondays allows you to send messages to your doctor, view your test results, renew your prescriptions, schedule appointments, and more. How Do I Sign Up? 1. In your internet browser, go to [email or web address]  2. Click on the First Time User? Click Here link in the Sign In box. You will be redirect to the New Member Sign Up page.   3. Enter your Trendy Mondays Access Code exactly as it appears below. You will not need to use this code after youve completed the sign-up process. If you do not sign up before the expiration date, you must request a new code. The African Store Access Code: [email or web address]  Expires: 2017 10:46 PM (This is the date your The [employer] Store access code will )    4. Enter the last four digits of your Social Security Number (xxxx) and Date of Birth (mm/dd/yyyy) as indicated and click Submit. You will be taken to the next sign-up page. 5. Create a The African Store ID. This will be your The African Store login ID and cannot be changed, so think of one that is secure and easy to remember. 6. Create a The [employer] Store password. You can change your password at any time. 7. Enter your Password Reset Question and Answer. This can be used at a later time if you forget your password. 8. Enter your e-mail address. You will receive e-mail notification when new information is available in [address] 9. Click Sign Up. You can now view and download portions of your medical record. 10. Click the Download Summary menu link to download a portable copy of your medical information. Additional Information    If you have questions, please visit the Frequently Asked Questions section of the The [employer] Store website at [email or web address] Remember, The [employer] Store is NOT to be used for urgent needs. For medical emergencies, dial 911.

## 2017-07-18 NOTE — PROGRESS NOTES
1050 Patient arrives to  from home with complaints of \"meagan mendes\", nausea, vomiting, diarrhea since Friday with vaginal pressure. She states the cramps have gotten stronger and more frequent today. Patient reports positive fetal movement but that the baby is \"quiet\". Patient will be a repeat  with h/o hsv. Patient denies outbreak in the last month. Patient admitted to [facility] and placed on monitors. Dr Viraj Tomlin notified of patient's arrival.     Dr Viraj Tomlin in to see patient. SVE long/thick/closed. Orders received for IV hydration and labs. 0020 3 IV attempts for iv hydration. OK per Dr Viraj Tomlin to po hydrate for now.

## 2017-07-18 NOTE — PROGRESS NOTES
Patient has had a combination of oral and IV hydration overnight and has slept intermittently but has continued to feel uncomfortable going home based on current contractions. FHR baseline 140, moderate variability  [place] CTX q 10-15 min  Cervix not able to be reached on recent exam attempt by RN    Will continue IV hydration; Dr. Yuly Powell aware of patient status, and she will be seen and reassessed by a [facility] physician this morning to determine further plan of care.     Robel Guillory MD  6:56 AM

## 2017-07-18 NOTE — PROGRESS NOTES
Bedside, Verbal and Written shift change report given to WES Winchester (oncoming nurse) by Liane Gotti, RN (offgoing nurse). Report included the following information SBAR, Kardex, MAR and Recent Results.        @0225 IV started, bolus given;  @0500 pt sleeping, easily aroused, continues to report contractions  @0600 Dr. Gretchen Costello aware of patient continued reports of contractions, md in to see pat, discussed allowing private OB to see patient to determine plan of care;  [phone] with  [facility] via phone, Dr. Betzy Gordon will see patient in am  @0708 Dr. Betzy Gordon in, viewed strip, sve - no change, md discussed discharge plan with pat, orders recd;  @0710 pt off monitor, up to br to gown;  @0725 d/c instructions reviewed with patient, verbal acknowledgment noted;  @0735 Pt accompanied home by family;

## 2017-08-05 ENCOUNTER — HOSPITAL ENCOUNTER (EMERGENCY)
Age: 37
Discharge: HOME | End: 2017-08-05
Attending: OBSTETRICS & GYNECOLOGY | Admitting: OBSTETRICS & GYNECOLOGY
Payer: COMMERCIAL

## 2017-08-05 VITALS
BODY MASS INDEX: 36.44 KG/M2 | HEART RATE: 102 BPM | TEMPERATURE: 98.6 F | WEIGHT: 193 LBS | RESPIRATION RATE: 20 BRPM | SYSTOLIC BLOOD PRESSURE: 117 MMHG | HEIGHT: 61 IN | DIASTOLIC BLOOD PRESSURE: 70 MMHG

## 2017-08-05 PROCEDURE — 74011000250 HC RX REV CODE- 250

## 2017-08-05 PROCEDURE — 99284 EMERGENCY DEPT VISIT MOD MDM: CPT

## 2017-08-05 PROCEDURE — 75810000122 HC INCSN THMBSD HEMRHOD EXT

## 2017-08-05 PROCEDURE — 74011250637 HC RX REV CODE- 250/637: Performed by: OBSTETRICS & GYNECOLOGY

## 2017-08-05 RX ORDER — OXYCODONE AND ACETAMINOPHEN 5; 325 MG/1; MG/1
1 TABLET ORAL
Qty: 20 TAB | Refills: 0 | Status: SHIPPED | OUTPATIENT
Start: 2017-08-05 | End: 2017-09-04

## 2017-08-05 RX ORDER — LIDOCAINE HYDROCHLORIDE 10 MG/ML
INJECTION INFILTRATION; PERINEURAL
Status: DISCONTINUED
Start: 2017-08-05 | End: 2017-08-05 | Stop reason: HOSPADM

## 2017-08-05 RX ORDER — OXYCODONE AND ACETAMINOPHEN 10; 325 MG/1; MG/1
1 TABLET ORAL
Status: COMPLETED | OUTPATIENT
Start: 2017-08-05 | End: 2017-08-05

## 2017-08-05 RX ORDER — MAG HYDROX/ALUMINUM HYD/SIMETH 200-200-20
SUSPENSION, ORAL (FINAL DOSE FORM) ORAL 3 TIMES DAILY
Qty: 30 G | Refills: 0 | Status: SHIPPED | OUTPATIENT
Start: 2017-08-05 | End: 2019-04-05

## 2017-08-05 RX ADMIN — OXYCODONE HYDROCHLORIDE AND ACETAMINOPHEN 1 TABLET: 10; 325 TABLET ORAL at 03:25

## 2017-08-05 NOTE — PROGRESS NOTES
History & Physical    Name: Clint Cutler MRN: [id number]  SSN: [id number]    YOB: 1980  Age: 39 y.o. Sex: female      Subjective:     Reason for Admission:  Pregnancy and Hemorroids    History of Present Illness: Clint Cutler is a 39 y.o.  female with an estimated gestational age of 32w1d with Estimated Date of Delivery: 17. Patient complains of Severe Rectal pain for 1 days. Pregnancy has been complicated by none. Patient denies abdominal pain  , chest pain, contractions, fever, headache , nausea and vomiting, right upper quadrant pain  , shortness of breath, swelling, vaginal bleeding , vaginal leaking of fluid  and visual disturbances. She reports having used hemorridal cream at home. OB History      Para Term  AB Living    2 1    1    SAB TAB Ectopic Molar Multiple Live Births                 Past Medical History:   Diagnosis Date    Abnormal Papanicolaou smear of cervix     Genital herpes     last outbreak 3-4 weeks ago    Headache     Other unknown and unspecified cause of morbidity or mortality     Migraine    Psychiatric problem     depression during this pregnancy     Past Surgical History:   Procedure Laterality Date     DELIVERY ONLY          HX  SECTION       Social History     Occupational History    Not on file. Social History Main Topics    Smoking status: Never Smoker    Smokeless tobacco: Never Used    Alcohol use No    Drug use: No    Sexual activity: Not Currently     Birth control/ protection: None     Family History   Problem Relation Age of Onset    Hypertension Mother     Diabetes Maternal Aunt     Hypertension Maternal Aunt     Diabetes Paternal Aunt     Hypertension Paternal Aunt     Diabetes Paternal Grandfather        No Known Allergies  Prior to Admission medications    Medication Sig Start Date End Date Taking?  Authorizing Provider   PNV No12-Iron-FA-DSS-OM-3 29 mg iron-1 mg -50 mg CPKD Take  by mouth.    Historical Provider   multivitamin (ONE A DAY) tablet Take 1 Tab by mouth daily. Historical Provider   cyanocobalamin (VITAMIN B12) 500 mcg tablet Take 500 mcg by mouth daily. Historical Provider        Review of Systems    Objective:     Vitals:    Vitals:    08/05/17 0036   Weight: (P) 87.5 kg (193 lb)   Height: (P) 5' 1\" (1.549 m)      No data recorded. No Data Recorded     Physical Exam    Cervical Exam: Deferred  Uterine Activity: None  Membranes: Intact  Fetal Heart Rate: Reactive     Rectum Cleve sized apparent Thrombosed hemorriod    Labs: No results found for this or any previous visit (from the past 24 hour(s)). There is no problem list on file for this patient. Assessment and Plan:     IUP @ 35 weeks  Severely Painful Thrombosed hemorroids  Consult to General surgery for evaluation and management.  Possible I&D      Signed By:  Sandy Mejia MD     August 5, 2017                 istor

## 2017-08-05 NOTE — PROGRESS NOTES
8/5/2017 12:27 AM-Received client to [facility] with c/o hemorrhoid pain and discomfort     0112-Dr. Laura Gordon at bedside assessing client. 0135-Dr. Napier at bedside to assess. Talked with client about excision of her hemorrhoid. Client verbalized understand of the procedure. 0152-Dr. Alexis Woods started the procedure. 0205-Procedure completed. 0250-Client is very uncomfortable and c/o butt pain from the procedure. Client really doesn't want to take any medication but is starting to think that she may need some. Client wanted to know if I could give her a pill to take home so she won't have to stop by the pharmacy but I informed that I could not. I let her know that if I medicated her she would need to stay for a little while because she drove herself in. (The person with her does not drive). 0310-Client has decided to take a pain pill here and stay for a little while. Dr. Laura Gordon informed client's current situation and a one time dose of pain medication ordered. 0335-Client resting, no distress noted. 0430-Client still resting, no distress   0600-Client awake states that she thinks she can drive home now. 0625-Discharge instructions reviewed with client. No question from her at this time.

## 2017-08-05 NOTE — DISCHARGE INSTRUCTIONS
Tucks pads - place in freezer and apply one pad 6 times a day for a few days  Remove packing on Sunday or with next bm whichever comes first  Limit physical activity- try to keep your fee up for the next 2 days  You may need a pad   Apply hydrocortisone 3 times a day   Keep stool soft- take softener  As needed   Sitz bath 2-3 times a day and after BM     Hemorrhoids: Care Instructions  Your Care Instructions    Hemorrhoids are enlarged veins that develop in the anal canal. Bleeding during bowel movements, itching, swelling, and rectal pain are the most common symptoms. They can be uncomfortable at times, but hemorrhoids rarely are a serious problem. You can treat most hemorrhoids with simple changes to your diet and bowel habits. These changes include eating more fiber and not straining to pass stools. Most hemorrhoids do not need surgery or other treatment unless they are very large and painful or bleed a lot. Follow-up care is a key part of your treatment and safety. Be sure to make and go to all appointments, and call your doctor if you are having problems. Its also a good idea to know your test results and keep a list of the medicines you take. How can you care for yourself at home? · Sit in a few inches of warm water (sitz bath) 3 times a day and after bowel movements. The warm water helps with pain and itching. · Put ice on your anal area several times a day for 10 minutes at a time. Put a thin cloth between the ice and your skin. Follow this by placing a warm, wet towel on the area for another 10 to 20 minutes. · Take pain medicines exactly as directed. ¨ If the doctor gave you a prescription medicine for pain, take it as prescribed. ¨ If you are not taking a prescription pain medicine, ask your doctor if you can take an over-the-counter medicine. · Keep the anal area clean, but be gentle. Use water and a fragrance-free soap, such as [place], or use baby wipes or medicated pads, such as Tucks.   · Wear cotton underwear and loose clothing to decrease moisture in the anal area. · Eat more fiber. Include foods such as whole-grain breads and cereals, raw vegetables, raw and dried fruits, and beans. · Drink plenty of fluids, enough so that your urine is light yellow or clear like water. If you have kidney, heart, or liver disease and have to limit fluids, talk with your doctor before you increase the amount of fluids you drink. · Use a stool softener that contains bran or psyllium. You can save money by buying bran or psyllium (available in bulk at most health food stores) and sprinkling it on foods or stirring it into fruit juice. Or you can use a product such as Metamucil or Hydrocil. · Practice healthy bowel habits. ¨ Go to the bathroom as soon as you have the urge. ¨ Avoid straining to pass stools. Relax and give yourself time to let things happen naturally. ¨ Do not hold your breath while passing stools. ¨ Do not read while sitting on the toilet. Get off the toilet as soon as you have finished. · Take your medicines exactly as prescribed. Call your doctor if you think you are having a problem with your medicine. When should you call for help? Call 911 anytime you think you may need emergency care. For example, call if:  · You pass maroon or very bloody stools. Call your doctor now or seek immediate medical care if:  · You have increased pain. · You have increased bleeding. Watch closely for changes in your health, and be sure to contact your doctor if:  · Your symptoms have not improved after 3 or 4 days. Where can you learn more? Go to http://hafsa-ary.info/. Enter F228 in the search box to learn more about \"Hemorrhoids: Care Instructions. \"  Current as of: August 9, 2016  Content Version: 11.3  © 9474-2711 [employer]. Care instructions adapted under license by [employer] (which disclaims liability or warranty for this information).  If you have questions about a medical condition or this instruction, always ask your healthcare professional. [place] [address] any warranty or liability for your use of this information.

## 2017-08-05 NOTE — IP AVS SNAPSHOT
[address] [facility] [address] 
[phone] Patient: Carla Johnson MRN: [id number] DT: You are allergic to the following No active allergies Recent Documentation Height Weight BMI OB Status Smoking Status 1.549 m 87.5 kg 36.47 kg/m2 Pregnant Never Smoker Emergency Contacts Name Discharge Info Relation Home Work Mobile Jael Saldana DISCHARGE CAREGIVER [3] Father [15] [phone] About your hospitalization You were admitted on:  N/A You last received care in the:  [facility] 3 LABOR & DELIVERY You were discharged on:  2017 Unit phone number:  [phone] Why you were hospitalized Your primary diagnosis was:  Not on File Providers Seen During Your Hospitalizations Provider Role Specialty Primary office phone Jayce Barclay DO Attending Provider Obstetrics & Gynecology [phone] Your Primary Care Physician (PCP) Primary Care Physician Office Phone Office Fax OTHER, PHYS ** None ** ** None ** Follow-up Information Follow up With Details Comments Contact Info Juan Baires MD In 1 week For wound re-check [address] [place] [address] 
[phone] Binu Pires MD   Patient can only remember the practice name and not the physician Your Appointments 2017  8:00 AM EDT  
L&D PAT with [facility] L&D PAT  
[facility] LD PAT SHADOW ([address]) [facility] [address]  
[phone]   SECTION with Jayce Barclay DO  
[facility] 3 LABOR & DELIVERY (--)  
 [address] [facility] [address]  
[phone] Current Discharge Medication List  
  
START taking these medications Dose & Instructions Dispensing Information Comments Morning Noon Evening Bedtime  
 hydrocortisone 1 % ointment Commonly known as:  HYCORT Your last dose was: Your next dose is:    
   
   
 Apply  to affected area three (3) times daily. use thin layer Quantity:  30 g Refills:  0  
     
   
   
   
  
 oxyCODONE-acetaminophen 5-325 mg per tablet Commonly known as:  PERCOCET Your last dose was: Your next dose is:    
   
   
 Dose:  1 Tab Take 1 Tab by mouth every four (4) hours as needed for Pain. Max Daily Amount: 6 Tabs. Quantity:  20 Tab Refills:  0 ASK your doctor about these medications Dose & Instructions Dispensing Information Comments Morning Noon Evening Bedtime  
 cyanocobalamin 500 mcg tablet Commonly known as:  VITAMIN B12 Your last dose was: Your next dose is:    
   
   
 Dose:  500 mcg Take 500 mcg by mouth daily. Refills:  0  
     
   
   
   
  
 multivitamin tablet Commonly known as:  ONE A DAY Your last dose was: Your next dose is:    
   
   
 Dose:  1 Tab Take 1 Tab by mouth daily. Refills:  0  
     
   
   
   
  
 PNV No12-Iron-FA-DSS-OM-3 29 mg iron-1 mg -50 mg Cpkd Your last dose was: Your next dose is: Take  by mouth. Refills:  0 Where to Get Your Medications Information on where to get these meds will be given to you by the nurse or doctor. ! Ask your nurse or doctor about these medications  
  hydrocortisone 1 % ointment  
 oxyCODONE-acetaminophen 5-325 mg per tablet Discharge Instructions Tucks pads - place in freezer and apply one pad 6 times a day for a few days Remove packing on Sunday or with next bm whichever comes first 
Limit physical activity- try to keep your fee up for the next 2 days You may need a pad Apply hydrocortisone 3 times a day Keep stool soft- take softener  As needed Sitz bath 2-3 times a day and after BM Hemorrhoids: Care Instructions Your Care Instructions Hemorrhoids are enlarged veins that develop in the anal canal. Bleeding during bowel movements, itching, swelling, and rectal pain are the most common symptoms. They can be uncomfortable at times, but hemorrhoids rarely are a serious problem. You can treat most hemorrhoids with simple changes to your diet and bowel habits. These changes include eating more fiber and not straining to pass stools. Most hemorrhoids do not need surgery or other treatment unless they are very large and painful or bleed a lot. Follow-up care is a key part of your treatment and safety. Be sure to make and go to all appointments, and call your doctor if you are having problems. Its also a good idea to know your test results and keep a list of the medicines you take. How can you care for yourself at home? · Sit in a few inches of warm water (sitz bath) 3 times a day and after bowel movements. The warm water helps with pain and itching. · Put ice on your anal area several times a day for 10 minutes at a time. Put a thin cloth between the ice and your skin. Follow this by placing a warm, wet towel on the area for another 10 to 20 minutes. · Take pain medicines exactly as directed. ¨ If the doctor gave you a prescription medicine for pain, take it as prescribed. ¨ If you are not taking a prescription pain medicine, ask your doctor if you can take an over-the-counter medicine. · Keep the anal area clean, but be gentle. Use water and a fragrance-free soap, such as [place], or use baby wipes or medicated pads, such as Tucks. · Wear cotton underwear and loose clothing to decrease moisture in the anal area. · Eat more fiber. Include foods such as whole-grain breads and cereals, raw vegetables, raw and dried fruits, and beans. · Drink plenty of fluids, enough so that your urine is light yellow or clear like water.  If you have kidney, heart, or liver disease and have to limit fluids, talk with your doctor before you increase the amount of fluids you drink. · Use a stool softener that contains bran or psyllium. You can save money by buying bran or psyllium (available in bulk at most health food stores) and sprinkling it on foods or stirring it into fruit juice. Or you can use a product such as Metamucil or Hydrocil. · Practice healthy bowel habits. ¨ Go to the bathroom as soon as you have the urge. ¨ Avoid straining to pass stools. Relax and give yourself time to let things happen naturally. ¨ Do not hold your breath while passing stools. ¨ Do not read while sitting on the toilet. Get off the toilet as soon as you have finished. · Take your medicines exactly as prescribed. Call your doctor if you think you are having a problem with your medicine. When should you call for help? Call 911 anytime you think you may need emergency care. For example, call if: 
· You pass maroon or very bloody stools. Call your doctor now or seek immediate medical care if: 
· You have increased pain. · You have increased bleeding. Watch closely for changes in your health, and be sure to contact your doctor if: 
· Your symptoms have not improved after 3 or 4 days. Where can you learn more? Go to http://hafsa-ary.info/. Enter F228 in the search box to learn more about \"Hemorrhoids: Care Instructions. \" Current as of: August 9, 2016 Content Version: 11.3 © 1788-8687 [employer]. Care instructions adapted under license by [place] [employer] (which disclaims liability or warranty for this information). If you have questions about a medical condition or this instruction, always ask your healthcare professional. [place] [address] any warranty or liability for your use of this information. Discharge Orders None Introducing [facility]!    
 [facility] introduces LiveHive patient portal. Now you can access parts of your medical record, email your doctor's office, and request medication refills online. 1. In your internet browser, go to [email or web address] 2. Click on the First Time User? Click Here link in the Sign In box. You will see the New Member Sign Up page. 3. Enter your RentFeeder Access Code exactly as it appears below. You will not need to use this code after youve completed the sign-up process. If you do not sign up before the expiration date, you must request a new code. · RentFeeder Access Code: [email or web address] Expires: 9/20/2017 10:46 PM 
 
4. Enter the last four digits of your Social Security Number (xxxx) and Date of Birth (mm/dd/yyyy) as indicated and click Submit. You will be taken to the next sign-up page. 5. Create a RentFeeder ID. This will be your RentFeeder login ID and cannot be changed, so think of one that is secure and easy to remember. 6. Create a RentFeeder password. You can change your password at any time. 7. Enter your Password Reset Question and Answer. This can be used at a later time if you forget your password. 8. Enter your e-mail address. You will receive e-mail notification when new information is available in [address] 9. Click Sign Up. You can now view and download portions of your medical record. 10. Click the Download Summary menu link to download a portable copy of your medical information. If you have questions, please visit the Frequently Asked Questions section of the RentFeeder website. Remember, RentFeeder is NOT to be used for urgent needs. For medical emergencies, dial 911. Now available from your iPhone and Android! General Information Please provide this summary of care documentation to your next provider. Patient Signature:  ____________________________________________________________ Date:  ____________________________________________________________  
  
Apurva Piety  Provider Signature: ____________________________________________________________ Date:  ____________________________________________________________

## 2017-08-05 NOTE — CONSULTS
[address] [facility] [address]   [address] [facility]       Name:  Arnaud Shields   MR#:  [id number]   :  1980   Account #:  [de-identified]    Date of Consultation:  2017   Date of Adm:  2017       HISTORY OF PRESENT ILLNESS: The patient is a 59-year-old   female who is 35 weeks pregnant, who presented to the hospital   complaining of a 2-day history of rectal pain. She states that she tried   to use Preparation-H and hot and warm cloth and ice; however, this did   not get any better. She denies any hard bowel movement. She has   never had anything like this before. She has also noted a small amount   of blood in her rectum. PAST MEDICAL HISTORY: Significant for abnormal Pap smear,   herpes, migraine headache, depression. PAST SURGICAL HISTORY:  section. ALLERGIES: SHE HAS NO KNOWN DRUG ALLERGIES. CURRENT MEDICATIONS: Just prenatal vitamins. SOCIAL HISTORY: She does not smoke. She does not drink any   alcohol. She currently works as a behavioral therapist.    FAMILY HISTORY: Significant for hypertension in her mother, diabetes   and hypertension in aunt, diabetes in grandfather. REVIEW OF SYSTEMS   Otherwise negative. PHYSICAL EXAMINATION   GENERAL: She is alert and oriented x4, in no acute distress. HEENT: Normocephalic, atraumatic. Her eyes are nonicteric. NECK: Supple. LUNGS: Clear. HEART: Regular rate and rhythm. ABDOMEN: Soft and gravid. RECTUM: Her rectum shows a left-sided thrombosed hemorrhoid. EXTREMITIES: No clubbing, cyanosis or edema. ASSESSMENT: A 59-year-old female with thrombosed hemorrhoid. Plan is to find incision and drainage of the thrombosed hemorrhoid. Informed consent was obtained. The patient was then prepped with   Betadine. She was then anesthetized with 1% lidocaine. Once   anesthetized, using a knife, an incision was made over the thrombosed   hemorrhoid.  We then squeezed out all of the clot. A clamp was used in   order to open the hemorrhoid to get all of the clot. Eventually, we were   able too. The wound was then packed, pressure was held. There was   still a small amount of bleeding. The wound was repacked as the   bleeding had slowed. A dressing of gauze and tape were then applied. The patient tolerated the procedure well. It is okay for her to be   discharged. DISCHARGE INSTRUCTIONS: She will begin doing Sitz baths 2-3   times a day and after each bowel movement. She will begin using   Tucks pad 6 times a day for the next few days, hydrocortisone   ointment 1% 3 times a day. She will keep her stool soft. She may   followup in the office in 1 week.         Swathi High MD      SS / CD   D:  08/05/2017   02:24   T:  08/05/2017   06:58   Job #:  [id number]

## 2017-08-07 LAB
GRBS, EXTERNAL: NEGATIVE
GRBS, EXTERNAL: POSITIVE

## 2017-08-30 ENCOUNTER — HOSPITAL ENCOUNTER (OUTPATIENT)
Dept: OTHER | Age: 37
Discharge: HOME | End: 2017-08-30
Payer: COMMERCIAL

## 2017-08-30 VITALS — BODY MASS INDEX: 36.06 KG/M2 | HEIGHT: 61 IN | WEIGHT: 191 LBS

## 2017-08-30 LAB
ERYTHROCYTE [DISTWIDTH] IN BLOOD BY AUTOMATED COUNT: 15 % (ref 11.5–14.5)
HCT VFR BLD AUTO: 37.8 % (ref 35–47)
HGB BLD-MCNC: 12.2 G/DL (ref 11.5–16)
MCH RBC QN AUTO: 26.8 PG (ref 26–34)
MCHC RBC AUTO-ENTMCNC: 32.3 G/DL (ref 30–36.5)
MCV RBC AUTO: 82.9 FL (ref 80–99)
PLATELET # BLD AUTO: 275 K/UL (ref 150–400)
RBC # BLD AUTO: 4.56 M/UL (ref 3.8–5.2)
WBC # BLD AUTO: 6.8 K/UL (ref 3.6–11)

## 2017-08-30 PROCEDURE — 85027 COMPLETE CBC AUTOMATED: CPT | Performed by: OBSTETRICS & GYNECOLOGY

## 2017-08-30 PROCEDURE — 86900 BLOOD TYPING SEROLOGIC ABO: CPT | Performed by: OBSTETRICS & GYNECOLOGY

## 2017-08-30 PROCEDURE — 36415 COLL VENOUS BLD VENIPUNCTURE: CPT | Performed by: OBSTETRICS & GYNECOLOGY

## 2017-08-30 RX ORDER — ACYCLOVIR 400 MG/1
400 TABLET ORAL 3 TIMES DAILY
COMMUNITY
End: 2017-09-04

## 2017-08-30 NOTE — PROGRESS NOTES
Patient here for Pre-Admission Testing (PAT) for scheduled  section. PAT packet reviewed with the patient. Labs drawn and sent. BRAD wipes and preventing surgical site infection education given to the patient. Education also provided to be NPO after Midnight and to arrive for scheduled procedure at 0600 on 17. Patient verbalizes understanding and sent home with PAT packet for further review. Patient instructed to take no medication(s) on the morning of her scheduled procedure.

## 2017-08-31 ENCOUNTER — ANESTHESIA EVENT (OUTPATIENT)
Dept: LABOR AND DELIVERY | Age: 37
End: 2017-08-31
Payer: COMMERCIAL

## 2017-08-31 ENCOUNTER — HOSPITAL ENCOUNTER (INPATIENT)
Age: 37
LOS: 4 days | Discharge: HOME | End: 2017-09-04
Attending: OBSTETRICS & GYNECOLOGY | Admitting: OBSTETRICS & GYNECOLOGY
Payer: COMMERCIAL

## 2017-08-31 ENCOUNTER — ANESTHESIA (OUTPATIENT)
Dept: LABOR AND DELIVERY | Age: 37
End: 2017-08-31
Payer: COMMERCIAL

## 2017-08-31 PROBLEM — Z34.80 NORMAL PREGNANCY IN MULTIGRAVIDA: Status: ACTIVE | Noted: 2017-08-31

## 2017-08-31 LAB
ABO + RH BLD: NORMAL
BLOOD GROUP ANTIBODIES SERPL: NORMAL
SPECIMEN EXP DATE BLD: NORMAL

## 2017-08-31 PROCEDURE — 74011250636 HC RX REV CODE- 250/636

## 2017-08-31 PROCEDURE — 76010000392 HC C SECN EA ADDL 0.5 HR: Performed by: OBSTETRICS & GYNECOLOGY

## 2017-08-31 PROCEDURE — 65410000002 HC RM PRIVATE OB

## 2017-08-31 PROCEDURE — 74011250636 HC RX REV CODE- 250/636: Performed by: ANESTHESIOLOGY

## 2017-08-31 PROCEDURE — 76060000078 HC EPIDURAL ANESTHESIA: Performed by: OBSTETRICS & GYNECOLOGY

## 2017-08-31 PROCEDURE — 10907ZC DRAINAGE OF AMNIOTIC FLUID, THERAPEUTIC FROM PRODUCTS OF CONCEPTION, VIA NATURAL OR ARTIFICIAL OPENING: ICD-10-PCS | Performed by: OBSTETRICS & GYNECOLOGY

## 2017-08-31 PROCEDURE — 74011250636 HC RX REV CODE- 250/636: Performed by: OBSTETRICS & GYNECOLOGY

## 2017-08-31 PROCEDURE — 36415 COLL VENOUS BLD VENIPUNCTURE: CPT | Performed by: OBSTETRICS & GYNECOLOGY

## 2017-08-31 PROCEDURE — 74011000250 HC RX REV CODE- 250

## 2017-08-31 PROCEDURE — 75410000003 HC RECOV DEL/VAG/CSECN EA 0.5 HR: Performed by: OBSTETRICS & GYNECOLOGY

## 2017-08-31 PROCEDURE — 4A1HXCZ MONITORING OF PRODUCTS OF CONCEPTION, CARDIAC RATE, EXTERNAL APPROACH: ICD-10-PCS | Performed by: OBSTETRICS & GYNECOLOGY

## 2017-08-31 PROCEDURE — 76010000391 HC C SECN FIRST 1 HR: Performed by: OBSTETRICS & GYNECOLOGY

## 2017-08-31 PROCEDURE — 77030007866 HC KT SPN ANES BBMI -B: Performed by: ANESTHESIOLOGY

## 2017-08-31 RX ORDER — DEXAMETHASONE SODIUM PHOSPHATE 4 MG/ML
INJECTION, SOLUTION INTRA-ARTICULAR; INTRALESIONAL; INTRAMUSCULAR; INTRAVENOUS; SOFT TISSUE AS NEEDED
Status: DISCONTINUED | OUTPATIENT
Start: 2017-08-31 | End: 2017-08-31 | Stop reason: HOSPADM

## 2017-08-31 RX ORDER — PHENYLEPHRINE 10 MG/250 ML(40 MCG/ML)IN 0.9 % SOD.CHLORIDE INTRAVENOUS
Status: DISCONTINUED
Start: 2017-08-31 | End: 2017-08-31

## 2017-08-31 RX ORDER — BUPIVACAINE HYDROCHLORIDE 7.5 MG/ML
INJECTION, SOLUTION EPIDURAL; RETROBULBAR AS NEEDED
Status: DISCONTINUED | OUTPATIENT
Start: 2017-08-31 | End: 2017-08-31 | Stop reason: HOSPADM

## 2017-08-31 RX ORDER — NALBUPHINE HYDROCHLORIDE 10 MG/ML
2.5 INJECTION, SOLUTION INTRAMUSCULAR; INTRAVENOUS; SUBCUTANEOUS
Status: DISPENSED | OUTPATIENT
Start: 2017-08-31 | End: 2017-09-01

## 2017-08-31 RX ORDER — OXYTOCIN/RINGER'S LACTATE 20/1000 ML
PLASTIC BAG, INJECTION (ML) INTRAVENOUS
Status: DISCONTINUED | OUTPATIENT
Start: 2017-08-31 | End: 2017-08-31 | Stop reason: HOSPADM

## 2017-08-31 RX ORDER — ONDANSETRON 2 MG/ML
4 INJECTION INTRAMUSCULAR; INTRAVENOUS
Status: DISCONTINUED | OUTPATIENT
Start: 2017-08-31 | End: 2017-09-04 | Stop reason: HOSPADM

## 2017-08-31 RX ORDER — MORPHINE SULFATE 10 MG/ML
6 INJECTION, SOLUTION INTRAMUSCULAR; INTRAVENOUS
Status: ACTIVE | OUTPATIENT
Start: 2017-08-31 | End: 2017-09-01

## 2017-08-31 RX ORDER — ONDANSETRON 2 MG/ML
INJECTION INTRAMUSCULAR; INTRAVENOUS AS NEEDED
Status: DISCONTINUED | OUTPATIENT
Start: 2017-08-31 | End: 2017-08-31 | Stop reason: HOSPADM

## 2017-08-31 RX ORDER — SODIUM CHLORIDE 0.9 % (FLUSH) 0.9 %
5-10 SYRINGE (ML) INJECTION AS NEEDED
Status: DISCONTINUED | OUTPATIENT
Start: 2017-08-31 | End: 2017-09-04 | Stop reason: HOSPADM

## 2017-08-31 RX ORDER — CEFAZOLIN SODIUM IN 0.9 % NACL 2 G/50 ML
2 INTRAVENOUS SOLUTION, PIGGYBACK (ML) INTRAVENOUS ONCE
Status: COMPLETED | OUTPATIENT
Start: 2017-08-31 | End: 2017-08-31

## 2017-08-31 RX ORDER — ONDANSETRON 2 MG/ML
4 INJECTION INTRAMUSCULAR; INTRAVENOUS
Status: DISCONTINUED | OUTPATIENT
Start: 2017-08-31 | End: 2017-08-31 | Stop reason: SDUPTHER

## 2017-08-31 RX ORDER — SIMETHICONE 80 MG
80 TABLET,CHEWABLE ORAL
Status: DISCONTINUED | OUTPATIENT
Start: 2017-08-31 | End: 2017-09-04 | Stop reason: HOSPADM

## 2017-08-31 RX ORDER — SODIUM CHLORIDE, SODIUM LACTATE, POTASSIUM CHLORIDE, CALCIUM CHLORIDE 600; 310; 30; 20 MG/100ML; MG/100ML; MG/100ML; MG/100ML
125 INJECTION, SOLUTION INTRAVENOUS CONTINUOUS
Status: DISCONTINUED | OUTPATIENT
Start: 2017-08-31 | End: 2017-09-04 | Stop reason: HOSPADM

## 2017-08-31 RX ORDER — NALOXONE HYDROCHLORIDE 0.4 MG/ML
0.4 INJECTION, SOLUTION INTRAMUSCULAR; INTRAVENOUS; SUBCUTANEOUS AS NEEDED
Status: DISCONTINUED | OUTPATIENT
Start: 2017-08-31 | End: 2017-09-04 | Stop reason: HOSPADM

## 2017-08-31 RX ORDER — OXYTOCIN/RINGER'S LACTATE 20/1000 ML
125-1000 PLASTIC BAG, INJECTION (ML) INTRAVENOUS
Status: DISCONTINUED | OUTPATIENT
Start: 2017-08-31 | End: 2017-08-31 | Stop reason: HOSPADM

## 2017-08-31 RX ORDER — ZOLPIDEM TARTRATE 5 MG/1
5 TABLET ORAL
Status: DISCONTINUED | OUTPATIENT
Start: 2017-08-31 | End: 2017-09-04 | Stop reason: HOSPADM

## 2017-08-31 RX ORDER — OXYTOCIN/RINGER'S LACTATE 20/1000 ML
125-1000 PLASTIC BAG, INJECTION (ML) INTRAVENOUS AS NEEDED
Status: DISCONTINUED | OUTPATIENT
Start: 2017-08-31 | End: 2017-09-04 | Stop reason: HOSPADM

## 2017-08-31 RX ORDER — DOCUSATE SODIUM 100 MG/1
100 CAPSULE, LIQUID FILLED ORAL
Status: DISCONTINUED | OUTPATIENT
Start: 2017-08-31 | End: 2017-09-04 | Stop reason: HOSPADM

## 2017-08-31 RX ORDER — AMMONIA 15 % (W/V)
1 AMPUL (EA) INHALATION AS NEEDED
Status: DISCONTINUED | OUTPATIENT
Start: 2017-08-31 | End: 2017-09-04 | Stop reason: HOSPADM

## 2017-08-31 RX ORDER — MORPHINE SULFATE 1 MG/ML
INJECTION, SOLUTION EPIDURAL; INTRATHECAL; INTRAVENOUS AS NEEDED
Status: DISCONTINUED | OUTPATIENT
Start: 2017-08-31 | End: 2017-08-31 | Stop reason: HOSPADM

## 2017-08-31 RX ORDER — SODIUM CHLORIDE 0.9 % (FLUSH) 0.9 %
5-10 SYRINGE (ML) INJECTION EVERY 8 HOURS
Status: DISCONTINUED | OUTPATIENT
Start: 2017-08-31 | End: 2017-09-04 | Stop reason: HOSPADM

## 2017-08-31 RX ORDER — HYDROCORTISONE ACETATE PRAMOXINE HCL 2.5; 1 G/100G; G/100G
CREAM TOPICAL AS NEEDED
Status: DISCONTINUED | OUTPATIENT
Start: 2017-08-31 | End: 2017-09-04 | Stop reason: HOSPADM

## 2017-08-31 RX ORDER — ACETAMINOPHEN 325 MG/1
650 TABLET ORAL
Status: DISCONTINUED | OUTPATIENT
Start: 2017-08-31 | End: 2017-09-04 | Stop reason: HOSPADM

## 2017-08-31 RX ORDER — KETOROLAC TROMETHAMINE 30 MG/ML
INJECTION, SOLUTION INTRAMUSCULAR; INTRAVENOUS AS NEEDED
Status: DISCONTINUED | OUTPATIENT
Start: 2017-08-31 | End: 2017-08-31 | Stop reason: HOSPADM

## 2017-08-31 RX ORDER — OXYCODONE AND ACETAMINOPHEN 5; 325 MG/1; MG/1
2 TABLET ORAL
Status: DISCONTINUED | OUTPATIENT
Start: 2017-08-31 | End: 2017-09-04 | Stop reason: HOSPADM

## 2017-08-31 RX ORDER — OXYCODONE AND ACETAMINOPHEN 5; 325 MG/1; MG/1
1 TABLET ORAL
Status: DISCONTINUED | OUTPATIENT
Start: 2017-08-31 | End: 2017-09-04 | Stop reason: HOSPADM

## 2017-08-31 RX ORDER — MORPHINE SULFATE 10 MG/ML
10 INJECTION, SOLUTION INTRAMUSCULAR; INTRAVENOUS
Status: DISPENSED | OUTPATIENT
Start: 2017-08-31 | End: 2017-09-01

## 2017-08-31 RX ORDER — HYDROCORTISONE 1 %
CREAM (GRAM) TOPICAL AS NEEDED
Status: DISCONTINUED | OUTPATIENT
Start: 2017-08-31 | End: 2017-09-04 | Stop reason: HOSPADM

## 2017-08-31 RX ORDER — KETOROLAC TROMETHAMINE 30 MG/ML
30 INJECTION, SOLUTION INTRAMUSCULAR; INTRAVENOUS
Status: DISPENSED | OUTPATIENT
Start: 2017-08-31 | End: 2017-09-01

## 2017-08-31 RX ORDER — IBUPROFEN 400 MG/1
800 TABLET ORAL EVERY 8 HOURS
Status: DISCONTINUED | OUTPATIENT
Start: 2017-08-31 | End: 2017-09-04 | Stop reason: HOSPADM

## 2017-08-31 RX ORDER — ACETAMINOPHEN 10 MG/ML
INJECTION, SOLUTION INTRAVENOUS AS NEEDED
Status: DISCONTINUED | OUTPATIENT
Start: 2017-08-31 | End: 2017-08-31 | Stop reason: HOSPADM

## 2017-08-31 RX ADMIN — SODIUM CHLORIDE, SODIUM LACTATE, POTASSIUM CHLORIDE, AND CALCIUM CHLORIDE 125 ML/HR: 600; 310; 30; 20 INJECTION, SOLUTION INTRAVENOUS at 18:55

## 2017-08-31 RX ADMIN — DEXAMETHASONE SODIUM PHOSPHATE 4 MG: 4 INJECTION, SOLUTION INTRA-ARTICULAR; INTRALESIONAL; INTRAMUSCULAR; INTRAVENOUS; SOFT TISSUE at 08:50

## 2017-08-31 RX ADMIN — CEFAZOLIN 2 G: 1 INJECTION, POWDER, FOR SOLUTION INTRAMUSCULAR; INTRAVENOUS; PARENTERAL at 08:03

## 2017-08-31 RX ADMIN — ACETAMINOPHEN 1000 MG: 10 INJECTION, SOLUTION INTRAVENOUS at 08:56

## 2017-08-31 RX ADMIN — MORPHINE SULFATE 10 MG: 10 INJECTION, SOLUTION INTRAMUSCULAR; INTRAVENOUS at 10:32

## 2017-08-31 RX ADMIN — Medication: at 08:54

## 2017-08-31 RX ADMIN — KETOROLAC TROMETHAMINE 30 MG: 30 INJECTION, SOLUTION INTRAMUSCULAR; INTRAVENOUS at 09:25

## 2017-08-31 RX ADMIN — SODIUM CHLORIDE, SODIUM LACTATE, POTASSIUM CHLORIDE, AND CALCIUM CHLORIDE 125 ML/HR: 600; 310; 30; 20 INJECTION, SOLUTION INTRAVENOUS at 11:02

## 2017-08-31 RX ADMIN — KETOROLAC TROMETHAMINE 30 MG: 30 INJECTION, SOLUTION INTRAMUSCULAR at 15:50

## 2017-08-31 RX ADMIN — SODIUM CHLORIDE, POTASSIUM CHLORIDE, SODIUM LACTATE AND CALCIUM CHLORIDE: 600; 310; 30; 20 INJECTION, SOLUTION INTRAVENOUS at 07:00

## 2017-08-31 RX ADMIN — KETOROLAC TROMETHAMINE 30 MG: 30 INJECTION, SOLUTION INTRAMUSCULAR at 22:14

## 2017-08-31 RX ADMIN — ONDANSETRON 4 MG: 2 INJECTION INTRAMUSCULAR; INTRAVENOUS at 08:51

## 2017-08-31 RX ADMIN — MORPHINE SULFATE 250 MCG: 1 INJECTION, SOLUTION EPIDURAL; INTRATHECAL; INTRAVENOUS at 08:18

## 2017-08-31 RX ADMIN — NALBUPHINE HYDROCHLORIDE 2.5 MG: 10 INJECTION, SOLUTION INTRAMUSCULAR; INTRAVENOUS; SUBCUTANEOUS at 18:44

## 2017-08-31 RX ADMIN — SODIUM CHLORIDE, SODIUM LACTATE, POTASSIUM CHLORIDE, AND CALCIUM CHLORIDE 1000 ML: 600; 310; 30; 20 INJECTION, SOLUTION INTRAVENOUS at 06:43

## 2017-08-31 RX ADMIN — BUPIVACAINE HYDROCHLORIDE 12 MG: 7.5 INJECTION, SOLUTION EPIDURAL; RETROBULBAR at 08:18

## 2017-08-31 NOTE — IP AVS SNAPSHOT
[address] 
[phone] Patient: Edilma Okeefe MRN: [id number] LYW:[id number] Current Discharge Medication List  
  
START taking these medications Dose & Instructions Dispensing Information Comments Morning Noon Evening Bedtime  
 ibuprofen 800 mg tablet Commonly known as:  MOTRIN Your last dose was: Your next dose is:    
   
   
 Dose:  800 mg Take 1 Tab by mouth every eight (8) hours as needed for Pain. Quantity:  30 Tab Refills:  0 CONTINUE these medications which have CHANGED Dose & Instructions Dispensing Information Comments Morning Noon Evening Bedtime  
 oxyCODONE-acetaminophen 5-325 mg per tablet Commonly known as:  PERCOCET What changed:  reasons to take this Your last dose was: Your next dose is:    
   
   
 Dose:  1 Tab Take 1 Tab by mouth every four (4) hours as needed. Max Daily Amount: 6 Tabs. Quantity:  30 Tab Refills:  0 CONTINUE these medications which have NOT CHANGED Dose & Instructions Dispensing Information Comments Morning Noon Evening Bedtime  
 cyanocobalamin 500 mcg tablet Commonly known as:  VITAMIN B12 Your last dose was: Your next dose is:    
   
   
 Dose:  500 mcg Take 500 mcg by mouth daily. Refills:  0  
     
   
   
   
  
 hydrocortisone 1 % ointment Commonly known as:  HYCORT Your last dose was: Your next dose is:    
   
   
 Apply  to affected area three (3) times daily. use thin layer Quantity:  30 g Refills:  0  
     
   
   
   
  
 multivitamin tablet Commonly known as:  ONE A DAY Your last dose was: Your next dose is:    
   
   
 Dose:  1 Tab Take 1 Tab by mouth daily. Refills:  0  
     
   
   
   
  
 PNV No12-Iron-FA-DSS-OM-3 29 mg iron-1 mg -50 mg Cpkd Your last dose was: Your next dose is: Take  by mouth. Refills:  0 STOP taking these medications   
 acyclovir 400 mg tablet Commonly known as:  ZOVIRAX Where to Get Your Medications Information on where to get these meds will be given to you by the nurse or doctor. ! Ask your nurse or doctor about these medications  
  ibuprofen 800 mg tablet  
 oxyCODONE-acetaminophen 5-325 mg per tablet

## 2017-08-31 NOTE — PROGRESS NOTES
0740 Bedside shift change report given to Michelle Roman RN (oncoming nurse) by Fredy Aguirre RN (offgoing nurse). Report included the following information SBAR, Kardex, Intake/Output, MAR, Accordion and Recent Results. 8068 Dr Gavin Linda at bedside assessing patient and answering questions. Consent signed. 9198 Patient ambulatory to OR 1    0853  liveborn female delivered via LTCS by Dr Gavin Linda; apgars 9/9. Baby skin to skin with mother. 0941Patient transferred to MIU rm 330 for recovery    0951 Bedside shift change report given to Lillie Haley RN (oncoming nurse) by Michelle Roman RN (offgoing nurse). Report included the following information SBAR, Kardex, Intake/Output, MAR, Accordion and Recent Results.

## 2017-08-31 NOTE — ANESTHESIA POSTPROCEDURE EVALUATION
Post-Anesthesia Evaluation and Assessment    Patient: Kaitlin Sweet MRN: [id number]  SSN: [id number]    YOB: 1980  Age: 39 y.o. Sex: female       Cardiovascular Function/Vital Signs  Visit Vitals    /76    Pulse 86    Temp 36.6 °C (97.9 °F)    Resp 16    Ht 5' 1\" (1.549 m)    Wt 86.6 kg (191 lb)    SpO2 98%    BMI 36.09 kg/m2       Patient is status post spinal anesthesia for Procedure(s):   SECTION. Nausea/Vomiting: None    Postoperative hydration reviewed and adequate. Pain:  Pain Scale 1: Numeric (0 - 10) (17)  Pain Intensity 1: 7 (17)   Managed    Neurological Status: At baseline    Mental Status and Level of Consciousness: Arousable    Pulmonary Status:   O2 Device: Room air (17)   Adequate oxygenation and airway patent    Complications related to anesthesia: None    Post-anesthesia assessment completed.  No concerns    Signed By: Cyndie Gilbert MD     2017

## 2017-08-31 NOTE — IP AVS SNAPSHOT
[address] 
[phone] Patient: Edilma Okeefe MRN: [id number] You are allergic to the following Allergen Reactions Novocain (Procaine) Other (comments) Shaking and feeling cold Recent Documentation Height Weight Breastfeeding? BMI OB Status Smoking Status 1.549 m 86.6 kg Unknown 36.09 kg/m2 Recent pregnancy Former Smoker Emergency Contacts Name Discharge Info Relation Home Work Mobile Jael Saldana DISCHARGE CAREGIVER [3] Friend [5] [phone] Breanne Siegel  Sister [23] [phone] About your hospitalization You were admitted on:  August 31, 2017 You last received care in the:  [address] [facility] You were discharged on:  September 3, 2017 Unit phone number:  [phone] Why you were hospitalized Your primary diagnosis was:  Not on File Your diagnoses also included:  Normal Pregnancy In [facility] 3 Providers Seen During Your Hospitalizations Provider Role Specialty Primary office phone Merlin Pichardo DO Attending Provider Obstetrics & Gynecology [phone] Your Primary Care Physician (PCP) Primary Care Physician Office Phone Office Fax OTHER, PHYS ** None ** ** None ** Follow-up Information Follow up With Details Comments Contact Info [facility] Call As needed for breastfeeding questions or concerns. [address] [facility], [address] 
[phone] Elinor Son, DO Go on 9/6/2017 at 1045 for staple removal.  Go to [place] [address] [address] [place] [address] 
[phone] Current Discharge Medication List  
  
START taking these medications Dose & Instructions Dispensing Information Comments Morning Noon Evening Bedtime  
 ibuprofen 800 mg tablet Commonly known as:  MOTRIN  
   
 Your last dose was: Your next dose is:    
   
   
 Dose:  800 mg Take 1 Tab by mouth every eight (8) hours as needed for Pain. Quantity:  30 Tab Refills:  0 CONTINUE these medications which have CHANGED Dose & Instructions Dispensing Information Comments Morning Noon Evening Bedtime  
 oxyCODONE-acetaminophen 5-325 mg per tablet Commonly known as:  PERCOCET What changed:  reasons to take this Your last dose was: Your next dose is:    
   
   
 Dose:  1 Tab Take 1 Tab by mouth every four (4) hours as needed. Max Daily Amount: 6 Tabs. Quantity:  30 Tab Refills:  0 CONTINUE these medications which have NOT CHANGED Dose & Instructions Dispensing Information Comments Morning Noon Evening Bedtime  
 cyanocobalamin 500 mcg tablet Commonly known as:  VITAMIN B12 Your last dose was: Your next dose is:    
   
   
 Dose:  500 mcg Take 500 mcg by mouth daily. Refills:  0  
     
   
   
   
  
 hydrocortisone 1 % ointment Commonly known as:  HYCORT Your last dose was: Your next dose is:    
   
   
 Apply  to affected area three (3) times daily. use thin layer Quantity:  30 g Refills:  0  
     
   
   
   
  
 multivitamin tablet Commonly known as:  ONE A DAY Your last dose was: Your next dose is:    
   
   
 Dose:  1 Tab Take 1 Tab by mouth daily. Refills:  0  
     
   
   
   
  
 PNV No12-Iron-FA-DSS-OM-3 29 mg iron-1 mg -50 mg Cpkd Your last dose was: Your next dose is: Take  by mouth. Refills:  0 STOP taking these medications   
 acyclovir 400 mg tablet Commonly known as:  ZOVIRAX Where to Get Your Medications Information on where to get these meds will be given to you by the nurse or doctor. ! Ask your nurse or doctor about these medications ibuprofen 800 mg tablet  
 oxyCODONE-acetaminophen 5-325 mg per tablet Discharge Instructions  Section: What to Expect at [facility] Your Recovery: A  section, or , is surgery to deliver your baby through a cut, called an incision that the doctor makes in your lower belly and uterus. You may have some pain in your lower belly and need pain medicine for 1 to 2 weeks. You can expect some vaginal bleeding for several weeks. You will probably need about 6 weeks to fully recover. It is important to take it easy while the incision is healing. Avoid heavy lifting, strenuous activities, or exercises that strain the belly muscles while you are recovering. Ask a family member or friend for help with housework, cooking, and shopping. This care sheet gives you a general idea about how long it will take for you to recover. But each person recovers at a different pace. Follow the steps below to get better as quickly as possible. How can you care for yourself at home? Activity · Rest when you feel tired. Getting enough sleep will help you recover. · Try to walk each day. Start by walking a little more than you did the day before. Bit by bit, increase the amount you walk. Walking boosts blood flow and helps prevent pneumonia, constipation, and blood clots. · Avoid strenuous activities, such as bicycle riding, jogging, weightlifting, and aerobic exercise, for 6 weeks or until your doctor says it is okay. · Until your doctor says it is okay, do not lift anything heavier than your baby. · Do not do sit-ups or other exercise that strain the belly muscles for 6 weeks or until your doctor says it is okay. · Hold a pillow over your incision when you cough or take deep breaths. This will support your belly and decrease your pain. · You may shower as usual. Pat the incision dry when you are done. · You will have some vaginal bleeding. Wear sanitary pads.  Do not douche or use tampons until your doctor says it is okay. · Ask your doctor when you can drive again. · You will probably need to take at least 6 weeks off work. It depends on the type of work you do and how you feel. · Wait until you are healed (about 4 to 6 weeks) before you have sexual intercourse. Your doctor will tell you when it is okay to have sex. · Talk to your doctor about birth control. You can get pregnant even before your period returns. Also, you can get pregnant while you are breast-feeding. Incision care Your skin is your body's first line of defense against germs, but an incision site leaves an easy way for germs to enter your body. To prevent infection: · Clean your hands frequently and before and after changing any touching any dressings. · If you have strips of tape on the incision, leave the tape on for a week or until it falls off. · Look at your incision closely every day for any changes. Contact your doctor if you experience any signs of infection, such as fever or increased redness at the surgical site. · Wash the area daily with warm, soapy water, and pat it dry. Don't use hydrogen peroxide or alcohol, which can slow healing. You may cover the area with a gauze bandage if it weeps or rubs against clothing. Change the bandage every day. · Keep the area clean and dry. Diet · You can eat your normal diet. If your stomach is upset, try bland, low-fat foods like plain rice, broiled chicken, toast, and yogurt. · Drink plenty of fluids (unless your doctor tells you not to). · You may notice that your bowel movements are not regular right after your surgery. This is common. Try to avoid constipation and straining with bowel movements. You may want to take a fiber supplement every day. If you have not had a bowel movement after a couple of days, ask your doctor about taking a mild laxative. · If you are breast-feeding, do not drink any alcohol. Medicines · Take pain medicines exactly as directed. · If the doctor gave you a prescription medicine for pain, take it as prescribed. · If you are not taking a prescription pain medicine, ask your doctor if you can take an over-the-counter medicine such as acetaminophen (Tylenol), ibuprofen (Advil, Motrin), or naproxen (Aleve), for cramps. Read and follow all instructions on the label. Do not take aspirin, because it can cause more bleeding. Do not take acetaminophen (Tylenol) and other acetaminophen containing medications (i.e. Percocet) at the same time. · If you think your pain medicine is making you sick to your stomach: 
· Take your medicine after meals (unless your doctor has told you not to). · Ask your doctor for a different pain medicine. · If your doctor prescribed antibiotics, take them as directed. Do not stop taking them just because you feel better. You need to take the full course of antibiotics. Mental Health · Many women get the \"baby blues\" during the first few days after childbirth. You may lose sleep, feel irritable, and cry easily. You may feel happy one minute and sad the next. Hormone changes are one cause of these emotional changes. Also, the demands of a new baby, along with visits from relatives or other family needs, add to a mother's stress. The \"baby blues\" often peak around the fourth day. Then they ease up in less than 2 weeks. · If your moodiness or anxiety lasts for more than 2 weeks, or if you feel like life is not worth living, you may have postpartum depression. This is different for each mother. Some mothers with serious depression may worry intensely about their infant's well-being. Others may feel distant from their child. Some mothers might even feel that they might harm their baby. A mother may have signs of paranoia, wondering if someone is watching her.  
 
 
· With all the changes in your life, you may not know if you are depressed. Pregnancy sometimes causes changes in how you feel that are similar to the symptoms of depression. · Symptoms of depression include: · Feeling sad or hopeless and losing interest in daily activities. These are the most common symptoms of depression. · Sleeping too much or not enough. · Feeling tired. You may feel as if you have no energy. · Eating too much or too little. · POSTPARTUM [employer] (PSI) offers a Warm line; Chat with the Expert phone sessions; Information and Articles about Pregnancy and Postpartum Mood Disorders; Comprehensive List of Free Support Groups; Knowledgeable local coordinators who will offer support, information, and resources; Guide to Resources on Insync; Calendar of events in the  mood disorders community; Latest News and Research; and [facility] [address] for [employer]. Remember - You are not alone; You are not to blame; With help, you will be well. 9-311-458-PPD(8673). WWW. POSTPARTUM. NET · Writing or talking about death, such as writing suicide notes or talking about guns, knives, or pills. Keep the numbers for these national suicide hotlines: 8-810-939-TALK (9-085-556-692.952.1203) and 1-716-ZXADNTH (3-714.398.9366). If you or someone you know talks about suicide or feeling hopeless, get help right away. Other instructions · If you breast-feed your baby, you may be more comfortable while you are healing if you place the baby so that he or she is not resting on your belly. Try tucking your baby under your arm, with his or her body along the side you will be feeding on. Support your baby's upper body with your arm. With that hand you can control your baby's head to bring his or her mouth to your breast. This is sometimes called the football hold. Follow-up care is a key part of your treatment and safety.   Be sure to make and go to all appointments, and call your doctor if you are having problems. It's also a good idea to know your test results and keep a list of the medicines you take. When should you call for help? Call 911 anytime you think you may need emergency care. For example, call if: 
· You are thinking of hurting yourself, your baby, or anyone else. · You passed out (lost consciousness). · You have symptoms of a blood clot in your lung (called a pulmonary embolism). These may include: 
· Sudden chest pain. · Trouble breathing. · Coughing up blood. Call your doctor now or seek immediate medical care if: 
 
· You have severe vaginal bleeding. · You are soaking through a pad each hour for 2 or more hours. · Your vaginal bleeding seems to be getting heavier or is still bright red 4 days after delivery. · You are dizzy or lightheaded, or you feel like you may faint. · You are vomiting or cannot keep fluids down. · You have a fever. · You have new or more belly pain. · You have loose stitches, or your incision comes open. · You have symptoms of infection, such as: 
· Increased pain, swelling, warmth, or redness. · Red streaks leading from the incision. · Pus draining from the incision. · A fever · You pass tissue (not just blood). · Your vaginal discharge smells bad. · Your belly feels tender or full and hard. · Your breasts are continuously painful or red. · You feel sad, anxious, or hopeless for more than a few days. · You have sudden, severe pain in your belly. · You have symptoms of a blood clot in your leg (called a deep vein thrombosis), such as: 
· Pain in your calf, back of the knee, thigh, or groin. · Redness and swelling in your leg or groin. · You have symptoms of preeclampsia, such as: 
· Sudden swelling of your face, hands, or feet. · New vision problems (such as dimness or blurring). · A severe headache. · Your blood pressure is higher than it should be or rises suddenly. · You have new nausea or vomiting. Watch closely for changes in your health, and be sure to contact your doctor if you have any problems. POSTPARTUM DISCHARGE INSTRUCTIONS Name:  Ruth Osorio YOB: 1980 Admission Diagnosis:  Normal pregnancy in multigravida Discharge Diagnosis:   
Problem List as of 9/3/2017  Date Reviewed: 2017 Codes Class Noted - Resolved Normal pregnancy in multigravida ICD-10-CM: Z34.80 ICD-9-CM: V22.1  2017 - Present Attending Physician:  Rand Ortiz DO Delivery Type:   Section: What to Expect at [facility] Your Recovery: A  section, or , is surgery to deliver your baby through a cut, called an incision that the doctor makes in your lower belly and uterus. You may have some pain in your lower belly and need pain medicine for 1 to 2 weeks. You can expect some vaginal bleeding for several weeks. You will probably need about 6 weeks to fully recover. It is important to take it easy while the incision is healing. Avoid heavy lifting, strenuous activities, or exercises that strain the belly muscles while you are recovering. Ask a family member or friend for help with housework, cooking, and shopping. This care sheet gives you a general idea about how long it will take for you to recover. But each person recovers at a different pace. Follow the steps below to get better as quickly as possible. How can you care for yourself at home? Activity · Rest when you feel tired. Getting enough sleep will help you recover. · Try to walk each day. Start by walking a little more than you did the day before. Bit by bit, increase the amount you walk. Walking boosts blood flow and helps prevent pneumonia, constipation, and blood clots. · Avoid strenuous activities, such as bicycle riding, jogging, weightlifting, and aerobic exercise, for 6 weeks or until your doctor says it is okay. · Until your doctor says it is okay, do not lift anything heavier than your baby. · Do not do sit-ups or other exercise that strain the belly muscles for 6 weeks or until your doctor says it is okay. · Hold a pillow over your incision when you cough or take deep breaths. This will support your belly and decrease your pain. · You may shower as usual. Pat the incision dry when you are done. · You will have some vaginal bleeding. Wear sanitary pads. Do not douche or use tampons until your doctor says it is okay. · Ask your doctor when you can drive again. · You will probably need to take at least 6 weeks off work. It depends on the type of work you do and how you feel. · Wait until you are healed (about 4 to 6 weeks) before you have sexual intercourse. Your doctor will tell you when it is okay to have sex. · Talk to your doctor about birth control. You can get pregnant even before your period returns. Also, you can get pregnant while you are breast-feeding. Incision care Your skin is your body's first line of defense against germs, but an incision site leaves an easy way for germs to enter your body. To prevent infection: · Clean your hands frequently and before and after changing any touching any dressings. · If you have strips of tape on the incision, leave the tape on for a week or until it falls off. · Look at your incision closely every day for any changes. Contact your doctor if you experience any signs of infection, such as fever or increased redness at the surgical site. · Wash the area daily with warm, soapy water, and pat it dry. Don't use hydrogen peroxide or alcohol, which can slow healing. You may cover the area with a gauze bandage if it weeps or rubs against clothing. Change the bandage every day. · Keep the area clean and dry. Diet · You can eat your normal diet. If your stomach is upset, try bland, low-fat foods like plain rice, broiled chicken, toast, and yogurt. · Drink plenty of fluids (unless your doctor tells you not to). · You may notice that your bowel movements are not regular right after your surgery. This is common. Try to avoid constipation and straining with bowel movements. You may want to take a fiber supplement every day. If you have not had a bowel movement after a couple of days, ask your doctor about taking a mild laxative. · If you are breast-feeding, do not drink any alcohol. Medicines · Take pain medicines exactly as directed. · If the doctor gave you a prescription medicine for pain, take it as prescribed. · If you are not taking a prescription pain medicine, ask your doctor if you can take an over-the-counter medicine such as acetaminophen (Tylenol), ibuprofen (Advil, Motrin), or naproxen (Aleve), for cramps. Read and follow all instructions on the label. Do not take aspirin, because it can cause more bleeding. Do not take acetaminophen (Tylenol) and other acetaminophen containing medications (i.e. Percocet) at the same time. · If you think your pain medicine is making you sick to your stomach: 
· Take your medicine after meals (unless your doctor has told you not to). · Ask your doctor for a different pain medicine. · If your doctor prescribed antibiotics, take them as directed. Do not stop taking them just because you feel better. You need to take the full course of antibiotics. Mental Health · Many women get the \"baby blues\" during the first few days after childbirth. You may lose sleep, feel irritable, and cry easily. You may feel happy one minute and sad the next. Hormone changes are one cause of these emotional changes. Also, the demands of a new baby, along with visits from relatives or other family needs, add to a mother's stress. The \"baby blues\" often peak around the fourth day. Then they ease up in less than 2 weeks.  
· If your moodiness or anxiety lasts for more than 2 weeks, or if you feel like life is not worth living, you may have postpartum depression. This is different for each mother. Some mothers with serious depression may worry intensely about their infant's well-being. Others may feel distant from their child. Some mothers might even feel that they might harm their baby. A mother may have signs of paranoia, wondering if someone is watching her. · With all the changes in your life, you may not know if you are depressed. Pregnancy sometimes causes changes in how you feel that are similar to the symptoms of depression. · Symptoms of depression include: · Feeling sad or hopeless and losing interest in daily activities. These are the most common symptoms of depression. · Sleeping too much or not enough. · Feeling tired. You may feel as if you have no energy. · Eating too much or too little. · POSTPARTUM SU[employer] INTERNATIONAL (PSI) offers a Warm line; Chat with the Expert phone sessions; Information and Articles about Pregnancy and Postpartum Mood Disorders; Comprehensive List of Free Support Groups; Knowledgeable local coordinators who will offer support, information, and resources; Guide to Resources on App[employer]y; Calendar of events in the  mood disorders community; Latest News and Research; and [facility] [address] for [employer]. Remember - You are not alone; You are not to blame; With help, you will be well. 1-442-711-PPD(8327). WWW. POSTPARTUM. NET · Writing or talking about death, such as writing suicide notes or talking about guns, knives, or pills. Keep the numbers for these national suicide hotlines: 0-535-947-TALK (0-508.639.9880) and 4-287-NGIRVWZ (4-644.335.4094). If you or someone you know talks about suicide or feeling hopeless, get help right away. Other instructions · If you breast-feed your baby, you may be more comfortable while you are healing if you place the baby so that he or she is not resting on your belly. Try tucking your baby under your arm, with his or her body along the side you will be feeding on. Support your baby's upper body with your arm. With that hand you can control your baby's head to bring his or her mouth to your breast. This is sometimes called the football hold. Follow-up care is a key part of your treatment and safety. Be sure to make and go to all appointments, and call your doctor if you are having problems. It's also a good idea to know your test results and keep a list of the medicines you take. When should you call for help? Call 911 anytime you think you may need emergency care. For example, call if: 
· You are thinking of hurting yourself, your baby, or anyone else. · You passed out (lost consciousness). · You have symptoms of a blood clot in your lung (called a pulmonary embolism). These may include: 
· Sudden chest pain. · Trouble breathing. · Coughing up blood. Call your doctor now or seek immediate medical care if: 
 
· You have severe vaginal bleeding. · You are soaking through a pad each hour for 2 or more hours. · Your vaginal bleeding seems to be getting heavier or is still bright red 4 days after delivery. · You are dizzy or lightheaded, or you feel like you may faint. · You are vomiting or cannot keep fluids down. · You have a fever. · You have new or more belly pain. · You have loose stitches, or your incision comes open. · You have symptoms of infection, such as: 
· Increased pain, swelling, warmth, or redness. · Red streaks leading from the incision. · Pus draining from the incision. · A fever · You pass tissue (not just blood). · Your vaginal discharge smells bad. · Your belly feels tender or full and hard. · Your breasts are continuously painful or red. · You feel sad, anxious, or hopeless for more than a few days. · You have sudden, severe pain in your belly.  
· You have symptoms of a blood clot in your leg (called a deep vein thrombosis), such as: 
· Pain in your calf, back of the knee, thigh, or groin. · Redness and swelling in your leg or groin. · You have symptoms of preeclampsia, such as: 
· Sudden swelling of your face, hands, or feet. · New vision problems (such as dimness or blurring). · A severe headache. · Your blood pressure is higher than it should be or rises suddenly. · You have new nausea or vomiting. Watch closely for changes in your health, and be sure to contact your doctor if you have any problems. Additional Information:  Not applicable These are general instructions for a healthy lifestyle: No smoking/ No tobacco products/ Avoid exposure to second hand smoke Surgeon General's Warning:  Quitting smoking now greatly reduces serious risk to your health. Obesity, smoking, and sedentary lifestyle greatly increases your risk for illness A healthy diet, regular physical exercise & weight monitoring are important for maintaining a healthy lifestyle Recognize signs and symptoms of STROKE: 
 
F-face looks uneven A-arms unable to move or move unevenly S-speech slurred or non-existent T-time-call 911 as soon as signs and symptoms begin - DO NOT go  
    back to bed or wait to see if you get better - TIME IS BRAIN. I have had the opportunity to make my options or choices for discharge. I have received and understand these instructions. [facility]charge Orders None [facility] Announcement We are excited to announce that we are making your provider's discharge notes available to you in ZEALER. You will see these notes when they are completed and signed by the physician that discharged you from your recent hospital stay. If you have any questions or concerns about any information you see in [facility], please call the Health Information Department where you were seen or reach out to your Primary Care Provider for more information about your plan of care. Introducing [facility]! Rayo Del Castillo introduces [employer] patient portal. Now you can access parts of your medical record, email your doctor's office, and request medication refills online. 1. In your internet browser, go to [email or web address] 2. Click on the First Time User? Click Here link in the Sign In box. You will see the New Member Sign Up page. 3. Enter your [employer] Access Code exactly as it appears below. You will not need to use this code after youve completed the sign-up process. If you do not sign up before the expiration date, you must request a new code. · [employer] Access Code: [email or web address] Expires: 9/20/2017 10:46 PM 
 
4. Enter the last four digits of your Social Security Number (xxxx) and Date of Birth (mm/dd/yyyy) as indicated and click Submit. You will be taken to the next sign-up page. 5. Create a [employer] ID. This will be your [employer] login ID and cannot be changed, so think of one that is secure and easy to remember. 6. Create a [employer] password. You can change your password at any time. 7. Enter your Password Reset Question and Answer. This can be used at a later time if you forget your password. 8. Enter your e-mail address. You will receive e-mail notification when new information is available in [address] 9. Click Sign Up. You can now view and download portions of your medical record. 10. Click the Download Summary menu link to download a portable copy of your medical information. If you have questions, please visit the Frequently Asked Questions section of the [employer] website. Remember, [employer] is NOT to be used for urgent needs. For medical emergencies, dial 911. Now available from your iPhone and Android! General Information Please provide this summary of care documentation to your next provider. Patient Signature:  ____________________________________________________________ Date:  ____________________________________________________________  
  
Kenn Shackle Provider Signature:  ____________________________________________________________ Date:  ____________________________________________________________

## 2017-08-31 NOTE — ANESTHESIA PREPROCEDURE EVALUATION
Anesthetic History   No history of anesthetic complications            Review of Systems / Medical History  Patient summary reviewed, nursing notes reviewed and pertinent labs reviewed    Pulmonary  Within defined limits                 Neuro/Psych         Headaches and psychiatric history     Cardiovascular  Within defined limits                     GI/Hepatic/Renal  Within defined limits              Endo/Other        Obesity     Other Findings              Physical Exam    Airway  Mallampati: II  TM Distance: > 6 cm  Neck ROM: normal range of motion   Mouth opening: Normal     Cardiovascular  Regular rate and rhythm,  S1 and S2 normal,  no murmur, click, rub, or gallop             Dental  No notable dental hx       Pulmonary  Breath sounds clear to auscultation               Abdominal  GI exam deferred       Other Findings            Anesthetic Plan    ASA: 2  Anesthesia type: spinal      Post-op pain plan if not by surgeon: intrathecal opiates      Anesthetic plan and risks discussed with: Patient

## 2017-08-31 NOTE — PROGRESS NOTES
3917: Patient arrived ambulatory from home for scheduled repeat c/s.    0740: Bedside shift change report given to Lambert Hood RN (oncoming nurse) by Anna Selby RN (offgoing nurse). Report included the following information SBAR, MAR and Recent Results.

## 2017-08-31 NOTE — ROUTINE PROCESS
TRANSFER - IN REPORT:    Verbal report received from AKIKO Sol RN on Alejandro Hines  being received from L&D for routine progression of care      Report consisted of patients Situation, Background, Assessment and   Recommendations(SBAR). Information from the following report(s) SBAR was reviewed with the receiving nurse. Opportunity for questions and clarification was provided. Assessment completed upon patients arrival to unit and care assumed.

## 2017-08-31 NOTE — LACTATION NOTE
This note was copied from a baby's chart. Initial Lactation Consultation - Baby born by  this morning to a  mom at 43 weeks. Mom nursed her first child for 14 months. She said baby has been latching and nursing for 5 through out the day. Baby sleepy at this time. Mom will call out for assistance when baby begins to show feeding cues. Feeding Plan: Mother will keep baby skin to skin as often as possible, feed on demand,  respond to feeding cues, obtain latch, listen for audible swallowing, be aware of signs of oxytocin release/ cramping,thrist,sleepyness while breastfeeding, offer both breasts,and will not limit feedings. 0 - Mom stated baby was showing feeding cues. Mom has large nipples. Baby is not opening her mouth wide enough to get the whole nipple into her mouth. I gave mom some tips on getting her to open wide. I got the baby positioned in the football hold. We were able to get the baby to latch after multiple attempts but only for about 1 minute. I showed mom how to hand express and we were able to express 5 drops of colostrum that we put into the baby's mouth.

## 2017-08-31 NOTE — ANESTHESIA PROCEDURE NOTES
Spinal Block    Start time: 8/31/2017 8:12 AM  End time: 8/31/2017 8:16 AM  Performed by: Benson Wise  Authorized by: Benson Wise     Pre-procedure:   Indications: primary anesthetic  Preanesthetic Checklist: patient identified, risks and benefits discussed, anesthesia consent, site marked, patient being monitored and timeout performed    Timeout Time: 08:12          Spinal Block:   Patient Position:  Seated  Prep Region:  Lumbar  Prep: Betadine and patient draped      Location:  L3-4  Technique:  Single shot  Local:  Lidocaine 1%      Needle:   Needle Type:  Pencil-tip  Needle Gauge:  25 G  Attempts:  1      Events: CSF confirmed, no blood with aspiration and no paresthesia        Assessment:  Insertion:  Uncomplicated  Patient tolerance:  Patient tolerated the procedure well with no immediate complications

## 2017-08-31 NOTE — H&P
History & Physical    Name: Kierra Capps MRN: [id number]  SSN: [id number]    YOB: 1980  Age: 39 y.o. Sex: female      Subjective: scheduled C/S     Estimated Date of Delivery: 17  OB History      Para Term  AB Living    2 1    1    SAB TAB Ectopic Molar Multiple Live Births                   Ms. Drew De La O admitted with pregnancy at 39w0d for  section due to previous  section. Prenatal course was complicated by HSV, AMA, obesity, hemorrhoids and a previous . Please see prenatal records for details. Pt reports nausea and contractions this morning. Good FM. She denies HA, vision changes, CP, SOB, diarrhea, constipation or edema bilaterally. Past Medical History:   Diagnosis Date    Abnormal Papanicolaou smear of cervix     Chlamydia     during \"my early twenties\"    Genital herpes     last outbreak 3-4 weeks ago    Headache     Herpes gestationis     Other unknown and unspecified cause of morbidity or mortality     Migraine    Psychiatric problem     depression during this pregnancy     Past Surgical History:   Procedure Laterality Date     DELIVERY ONLY          HX  SECTION       Social History     Occupational History    Not on file.      Social History Main Topics    Smoking status: Former Smoker     Quit date: 2016    Smokeless tobacco: Never Used      Comment: smoke dfro about 1 month due to stress    Alcohol use No      Comment: social drinker when not pregnant    Drug use: No    Sexual activity: Not Currently     Birth control/ protection: None     Family History   Problem Relation Age of Onset    Hypertension Mother     Elevated Lipids Mother     Diabetes Maternal Aunt     Hypertension Maternal Aunt     Diabetes Paternal Aunt     Hypertension Paternal Aunt     Diabetes Paternal Grandfather     Hypertension Father     Elevated Lipids Father        Allergies   Allergen Reactions    Novocain [Procaine] Other (comments)     Shaking and feeling cold     Prior to Admission medications    Medication Sig Start Date End Date Taking? Authorizing Provider   acyclovir (ZOVIRAX) 400 mg tablet Take 400 mg by mouth three (3) times daily. Yes Historical Provider   PNV No12-Iron-FA-DSS-OM-3 29 mg iron-1 mg -50 mg CPKD Take  by mouth. Yes Historical Provider   hydrocortisone (HYCORT) 1 % ointment Apply  to affected area three (3) times daily. use thin layer 8/5/17   Berhane Moore MD   oxyCODONE-acetaminophen (PERCOCET) 5-325 mg per tablet Take 1 Tab by mouth every four (4) hours as needed for Pain. Max Daily Amount: 6 Tabs. 8/5/17   Berhane Moore MD   multivitamin (ONE A DAY) tablet Take 1 Tab by mouth daily. Historical Provider   cyanocobalamin (VITAMIN B12) 500 mcg tablet Take 500 mcg by mouth daily. Historical Provider        Review of Systems: A comprehensive review of systems was negative except for that written in the History of Present Illness. Objective:     Vitals:  Vitals:    08/31/17 0614 08/31/17 0720   BP: 119/62    Pulse: 94    Resp: 16    Temp: 98.3 °F (36.8 °C)    Weight:  86.6 kg (191 lb)   Height:  5' 1\" (1.549 m)        Physical Exam:  Patient without distress.   Heart: Regular rate and rhythm, no murmur  Lung: clear to auscultation throughout lung fields, no wheezes, no rales, no rhonchi and normal respiratory effort  Abdomen: soft, obese, nontender  Fundus: soft and non tender  Perineum: blood absent, amniotic fluid absent  Cervical Exam: deferred  Lower Extremities: no edema bilaterally  Membranes:  Intact  Fetal Heart Rate: Reactive  Baseline: 150 per minute  Variability: moderate  Accelerations: yes  Decelerations: none  Uterine contractions: occasional    Prenatal Labs:   Lab Results   Component Value Date/Time    Rubella, External immune 01/20/2017    GrBStrep, External negative 08/07/2017    GrBStrep, External positive 08/07/2017    HBsAg, External negative 01/20/2017    HIV, External negative 2017    RPR, External nonreactive 2017    Gonorrhea, External negative 2017    Chlamydia, External negative 2017        Impression/Plan:     Plan:  Admit for  section. Group B Strep was positive, use of prophylactic antibiotics not indicated. Discussed the risks of surgery including the risks of bleeding, infection, deep vein thrombosis, and surgical injuries to internal organs including but not limited to the bowels, bladder, rectum, and female reproductive organs. The patient understands the risks; any and all questions were answered to the patient's satisfaction. Plan to proceed with RLTCS as scheduled.      Signed By:  Lobo Baron DO     2017

## 2017-08-31 NOTE — OP NOTES
Section Operative Report       Patient: Grecia Montano MRN: [id number]  SSN: [id number]    YOB: 1980  Age: 39 y.o. Sex: female       Date of Procedure: 2017     Preoperative Diagnosis: REPEAT   NEEDS PANIS RETRACTOR    Postoperative Diagnosis: same, delivered    Procedure: Low Transverse Procedure(s):   SECTION    Surgeon(s):  MD Mack Torrez DO  Assistant:     Anesthesia: Spinal    Estimated Blood Loss :  800cc    Findings:   Information for the patient's :  [place] [[id number]]   Delivery of a   Female [1] infant on 2017 at 8:53 AM. Apgars were 9 and 9. Umbilical Cord: 3vc    Umbilical Cord Events: none    Placenta:  Manual removal with normal appearance. Amniotic Fluid Volume: Moderate     Amniotic Fluid Description:  Clear         Specimens:   ID Type Source Tests Collected by Time Destination   1 :  Placenta Uterus  Mack White DO 2017 [id number] Discarded               Complications:  none    Procedure Details:    After informed consent was obtained, the patient was taken to the operating room, where Spinal anesthesia was administered and found to be adequate. Fox catheter had been placed using sterile technique and was draining clear yellow urine prior to the start of the procedure. The patient was prepped and draped in the usual sterile fashion using the pannus retractor. A time out was performed with all members of the team present. After testing for adequate anesthesia, a Pfannenstiel incision was made and carried to the anterior rectus fascia which was nicked in the midline. This incision in the fascia was extended laterally with curved Hernandez Scissors. The rectus muscles were  from the fascial sheath with Bovie electrocautery and curved hernandez scissors. The muscles were then parted in the midline, the peritoneum was entered bluntly and stretched.  The bladder was noted to be adhered to the uterus and was back-filled with sterile saline to better visualize the plane of the bladder. Anterior portion of the uterus was adhered to the anterior abdominal wall peritoneum. Adhesion was double clamped with lit clamps, cut and tied with a free 0 vicryl suture after the needle was cut off. The bladder blade was then able to be inserted. The vesicouterine peritoneum was identified and entered sharply with Metzenbaum scissors. The bladder flap was then created sharply and the bladder blade was reinserted. The bladder was then drained. A low transverse uterine incision was made with the scalpel and extended laterally with blunt finger dissection and bandage scissors. Amniotomy was performed and clear fluid was noted. The babys head was then delivered atraumatically followed by the remainder of the body. The nose and mouth were suctioned. The cord was clamped and cut and the baby was handed off to the waiting [facility] certified staff. The placenta was then extracted from the uterus intact. The uterus was exteriorized and cleared of all clots and debris using a moist lap sponge. The uterine incision was closed with number 0 vicryl in a running locking fashion with a second layer used to imbricate the incision. Multiple figures-of-eight were placed for additional hemostasis. The posterior cul-de-sac was cleaned with a moist lap sponge. Good hemostasis of the hysterotomy was again confirmed and the uterus was returned to the abdomen. Good hemostasis was again reassured throughout, including the hysterotomy, bladder flap, rectus muscles and posterior surface of the fascia. Seprafilm was placed over the uterine incision and anterior adhesion that was taken down. The fascia was then closed with 0 Vicryl in a running fashion. Good hemostasis was assured. The subcuticular layers were reapproximated with 2-0 vicryl in a running stitch. The skin was closed with metal staples and covered with an anti-bacterial pressure dressing. The patient tolerated the procedure well. The decker catheter was noted to be draining clear yellow urine at the end of the procedure. Sponge, lap, and needle counts were correct times three and the patient and baby were taken to recovery/postpartum room in stable condition.     Signed By: Martínez Wang DO     August 31, 2017

## 2017-09-01 LAB
BASOPHILS # BLD: 0 K/UL (ref 0–0.1)
BASOPHILS NFR BLD: 0 % (ref 0–1)
EOSINOPHIL # BLD: 0.1 K/UL (ref 0–0.4)
EOSINOPHIL NFR BLD: 1 % (ref 0–7)
ERYTHROCYTE [DISTWIDTH] IN BLOOD BY AUTOMATED COUNT: 14.8 % (ref 11.5–14.5)
HCT VFR BLD AUTO: 31.8 % (ref 35–47)
HGB BLD-MCNC: 10.3 G/DL (ref 11.5–16)
LYMPHOCYTES # BLD: 2 K/UL (ref 0.8–3.5)
LYMPHOCYTES NFR BLD: 22 % (ref 12–49)
MCH RBC QN AUTO: 26.7 PG (ref 26–34)
MCHC RBC AUTO-ENTMCNC: 32.4 G/DL (ref 30–36.5)
MCV RBC AUTO: 82.4 FL (ref 80–99)
MONOCYTES # BLD: 0.5 K/UL (ref 0–1)
MONOCYTES NFR BLD: 6 % (ref 5–13)
NEUTS SEG # BLD: 6.4 K/UL (ref 1.8–8)
NEUTS SEG NFR BLD: 71 % (ref 32–75)
PLATELET # BLD AUTO: 225 K/UL (ref 150–400)
RBC # BLD AUTO: 3.86 M/UL (ref 3.8–5.2)
WBC # BLD AUTO: 9 K/UL (ref 3.6–11)

## 2017-09-01 PROCEDURE — 74011250636 HC RX REV CODE- 250/636: Performed by: ANESTHESIOLOGY

## 2017-09-01 PROCEDURE — 85025 COMPLETE CBC W/AUTO DIFF WBC: CPT | Performed by: OBSTETRICS & GYNECOLOGY

## 2017-09-01 PROCEDURE — 74011250637 HC RX REV CODE- 250/637: Performed by: OBSTETRICS & GYNECOLOGY

## 2017-09-01 PROCEDURE — 36415 COLL VENOUS BLD VENIPUNCTURE: CPT | Performed by: OBSTETRICS & GYNECOLOGY

## 2017-09-01 PROCEDURE — 65410000002 HC RM PRIVATE OB

## 2017-09-01 RX ORDER — DOCUSATE SODIUM 100 MG/1
100 CAPSULE, LIQUID FILLED ORAL 2 TIMES DAILY
Status: DISCONTINUED | OUTPATIENT
Start: 2017-09-01 | End: 2017-09-04 | Stop reason: HOSPADM

## 2017-09-01 RX ORDER — POLYETHYLENE GLYCOL 3350 17 G/17G
17 POWDER, FOR SOLUTION ORAL 2 TIMES DAILY
Status: DISCONTINUED | OUTPATIENT
Start: 2017-09-01 | End: 2017-09-04 | Stop reason: HOSPADM

## 2017-09-01 RX ADMIN — KETOROLAC TROMETHAMINE 30 MG: 30 INJECTION, SOLUTION INTRAMUSCULAR at 04:25

## 2017-09-01 RX ADMIN — IBUPROFEN 800 MG: 400 TABLET, FILM COATED ORAL at 10:45

## 2017-09-01 RX ADMIN — MORPHINE SULFATE 10 MG: 10 INJECTION, SOLUTION INTRAMUSCULAR; INTRAVENOUS at 00:48

## 2017-09-01 RX ADMIN — OXYCODONE HYDROCHLORIDE AND ACETAMINOPHEN 2 TABLET: 5; 325 TABLET ORAL at 13:57

## 2017-09-01 RX ADMIN — OXYCODONE HYDROCHLORIDE AND ACETAMINOPHEN 2 TABLET: 5; 325 TABLET ORAL at 22:05

## 2017-09-01 RX ADMIN — DOCUSATE SODIUM 100 MG: 100 CAPSULE, LIQUID FILLED ORAL at 17:58

## 2017-09-01 RX ADMIN — OXYCODONE HYDROCHLORIDE AND ACETAMINOPHEN 2 TABLET: 5; 325 TABLET ORAL at 17:58

## 2017-09-01 RX ADMIN — Medication 10 ML: at 04:26

## 2017-09-01 RX ADMIN — IBUPROFEN 800 MG: 400 TABLET, FILM COATED ORAL at 18:50

## 2017-09-01 RX ADMIN — OXYCODONE HYDROCHLORIDE AND ACETAMINOPHEN 2 TABLET: 5; 325 TABLET ORAL at 08:43

## 2017-09-01 NOTE — LACTATION NOTE
This note was copied from a baby's chart. I did not see the baby at the breast. Mom states the baby did some cluster feeding during the night but has been sleepy through out today. She says she is able to get the baby latched and she nurses for about 10 minutes. Baby is asleep at this time. Mom will call out at the next feeding if she needs any breast feeding help.

## 2017-09-01 NOTE — ROUTINE PROCESS
Bedside and Verbal shift change report given to OLGA Gonzalez RN (oncoming nurse) by KAREN Camacho RN (offgoing nurse). Report included the following information SBAR, Kardex, MAR and Accordion.

## 2017-09-01 NOTE — PROGRESS NOTES
Post-Operative  Day 1    Clint Cutler         Information for the patient's :  [place] [[id number]]   , Low Transverse   Patient doing well without unusual complaints. Has been OOB. S/p spontaneous void x3. Tolerating liquids, no flatus. Denies ha/sob/cp/palpitations/fevers/chills. Breastfeeding. Vitals:  Visit Vitals    BP 98/58 (BP 1 Location: Right arm, BP Patient Position: At rest)    Pulse 84    Temp 98.2 °F (36.8 °C)    Resp 14    Ht 5' 1\" (1.549 m)    Wt 86.6 kg (191 lb)    SpO2 98%    BMI 36.09 kg/m2     Temp (24hrs), Av.2 °F (36.8 °C), Min:97.9 °F (36.6 °C), Max:98.9 °F (37.2 °C)      Last 24hr Input/Output:    Intake/Output Summary (Last 24 hours) at 17 0826  Last data filed at 17 2110   Gross per 24 hour   Intake             1750 ml   Output             1925 ml   Net             -175 ml          Exam:       Patient without distress. Abdomen:soft, expected tenderness, fundus firm, wound dressing intact     Lower extremities are nontender without edema.  No cords    Labs:   Lab Results   Component Value Date/Time    WBC 9.0 2017 05:45 AM    WBC 6.8 2017 08:56 AM    WBC 10.5 2017 09:35 PM    WBC 8.0 2017 01:00 PM    WBC 4.7 2016 08:47 AM    HGB 10.3 2017 05:45 AM    HGB 12.2 2017 08:56 AM    HGB 11.3 2017 09:35 PM    HGB 12.2 2017 01:00 PM    HGB 13.2 2016 08:47 AM    HCT 31.8 2017 05:45 AM    HCT 37.8 2017 08:56 AM    HCT 34.8 2017 09:35 PM    HCT 38.1 2017 01:00 PM    HCT 40.4 2016 08:47 AM    PLATELET 003  05:45 AM    PLATELET 601  08:56 AM    PLATELET 201  09:35 PM    PLATELET 474  01:00 PM    PLATELET 939  08:47 AM       Recent Results (from the past 24 hour(s))   CBC WITH AUTOMATED DIFF    Collection Time: 17  5:45 AM   Result Value Ref Range    WBC 9.0 3.6 - 11.0 K/uL    RBC 3.86 3.80 - 5.20 M/uL    HGB 10.3 (L) 11.5 - 16.0 g/dL    HCT 31.8 (L) 35.0 - 47.0 %    MCV 82.4 80.0 - 99.0 FL    MCH 26.7 26.0 - 34.0 PG    MCHC 32.4 30.0 - 36.5 g/dL    RDW 14.8 (H) 11.5 - 14.5 %    PLATELET 168 642 - 164 K/uL    NEUTROPHILS 71 32 - 75 %    LYMPHOCYTES 22 12 - 49 %    MONOCYTES 6 5 - 13 %    EOSINOPHILS 1 0 - 7 %    BASOPHILS 0 0 - 1 %    ABS. NEUTROPHILS 6.4 1.8 - 8.0 K/UL    ABS. LYMPHOCYTES 2.0 0.8 - 3.5 K/UL    ABS. MONOCYTES 0.5 0.0 - 1.0 K/UL    ABS. EOSINOPHILS 0.1 0.0 - 0.4 K/UL    ABS. BASOPHILS 0.0 0.0 - 0.1 K/UL           Assessment: Post-Op day 1, s/p RLTCS. A+/RI/female infant    Plan:   1. Routine post-operative care   2. S/p lanced hemorrhoid during pregnancy. Will start colace and miralax BID to help prevent worsening hemorrhoids              3. Morbid obesity, nl glucose testing during pregnancy. Metal staples to be removed in the office next week.     Signed By: Inez Salgado DO     September 1, 2017

## 2017-09-01 NOTE — PROGRESS NOTES
Anesthesia Post Operative Day 1      The patient is status post C Section with spinal  anesthesia and Duramorph for pain. The patient relates the following scales: pain, mild; itching, None; nausea, None    All sympyoms were treated with medications per protocol. The patient is up and ambulating and has minimal complaints. Plan: Continue to treat breakthrough symptoms as needed with protocol medictions.      Marykay Siemens,

## 2017-09-02 PROCEDURE — 74011250637 HC RX REV CODE- 250/637: Performed by: OBSTETRICS & GYNECOLOGY

## 2017-09-02 PROCEDURE — 65410000002 HC RM PRIVATE OB

## 2017-09-02 RX ADMIN — OXYCODONE HYDROCHLORIDE AND ACETAMINOPHEN 2 TABLET: 5; 325 TABLET ORAL at 15:19

## 2017-09-02 RX ADMIN — IBUPROFEN 800 MG: 400 TABLET, FILM COATED ORAL at 02:28

## 2017-09-02 RX ADMIN — OXYCODONE HYDROCHLORIDE AND ACETAMINOPHEN 2 TABLET: 5; 325 TABLET ORAL at 02:28

## 2017-09-02 RX ADMIN — OXYCODONE HYDROCHLORIDE AND ACETAMINOPHEN 2 TABLET: 5; 325 TABLET ORAL at 06:30

## 2017-09-02 RX ADMIN — DOCUSATE SODIUM 100 MG: 100 CAPSULE, LIQUID FILLED ORAL at 10:38

## 2017-09-02 RX ADMIN — OXYCODONE HYDROCHLORIDE AND ACETAMINOPHEN 2 TABLET: 5; 325 TABLET ORAL at 10:35

## 2017-09-02 RX ADMIN — SIMETHICONE 80 MG: 80 TABLET, CHEWABLE ORAL at 10:38

## 2017-09-02 RX ADMIN — OXYCODONE HYDROCHLORIDE AND ACETAMINOPHEN 2 TABLET: 5; 325 TABLET ORAL at 19:56

## 2017-09-02 RX ADMIN — DOCUSATE SODIUM 100 MG: 100 CAPSULE, LIQUID FILLED ORAL at 18:44

## 2017-09-02 RX ADMIN — IBUPROFEN 800 MG: 400 TABLET, FILM COATED ORAL at 18:43

## 2017-09-02 RX ADMIN — IBUPROFEN 800 MG: 400 TABLET, FILM COATED ORAL at 10:35

## 2017-09-02 NOTE — LACTATION NOTE
This note was copied from a baby's chart. Mom states she is able to get the baby latched but she is only nursing for 5 minutes at a time. Baby is very sleepy. I helped mom with a feeding and we were able to get her latch. She began sucking but we had to keep stimulating her to get her to continue sucking. She only nursed for 5 minutes. Mom is concerned that the baby will get dehydrated. I set her up with the electric pump and recommended she pump after nursing to increase stimulation and milk production. If she pumps any milk she can offer it to the baby.

## 2017-09-02 NOTE — PROGRESS NOTES
Progress Note                               Patient: Salma Reddy MRN: [id number]  SSN: [id number]    YOB: 1980  Age: 39 y.o. Sex: female      2 Days Post-Op     Subjective:     Patient doing well postpartum without significant complaints. Voiding without difficulty. Passing flatus. Patient reports normal lochia. . Breastfeeding: Yes Denies nausea/vomiting/SOB/chest pain/fever/chills/HA. Objective:     Patient Vitals for the past 18 hrs:   Temp Pulse Resp BP   17 0951 98.1 °F (36.7 °C) 89 14 111/62   17 0610 98.4 °F (36.9 °C) 81 14 107/52   17 2140 98.3 °F (36.8 °C) 78 14 108/49       Temp (24hrs), Av.1 °F (36.7 °C), Min:97.6 °F (36.4 °C), Max:98.4 °F (36.9 °C)      Physical Exam:    Patient without distress. Heart: Regular rate and rhythm  Lung: clear to auscultation throughout lung fields, no wheezes, no rales, no rhonchi and normal respiratory effort  Abdomen: soft, nontender  Incision: dressing clean and dry  Fundus: firm, non tender and below umbilicus  Lower Extremities: no calf tenderness     Lab/Data Review: All lab results for the last 24 hours reviewed. Assessment and Plan:     Patient appears to be having an uncomplicated post- course. Continue routine postoperative care and maternal education. and plan for discharge home tomorrow. Signed By: Raymon Denise MD     2017

## 2017-09-02 NOTE — ROUTINE PROCESS
Bedside shift change report given to WES Armendariz RN (oncoming nurse) by Devante Workman RN (offgoing nurse). Report included the following information SBAR.

## 2017-09-03 PROCEDURE — 65410000002 HC RM PRIVATE OB

## 2017-09-03 PROCEDURE — 74011250637 HC RX REV CODE- 250/637: Performed by: OBSTETRICS & GYNECOLOGY

## 2017-09-03 RX ORDER — IBUPROFEN 800 MG/1
800 TABLET ORAL
Qty: 30 TAB | Refills: 0 | Status: SHIPPED | OUTPATIENT
Start: 2017-09-03 | End: 2017-11-28 | Stop reason: SDUPTHER

## 2017-09-03 RX ORDER — OXYCODONE AND ACETAMINOPHEN 5; 325 MG/1; MG/1
1 TABLET ORAL
Qty: 30 TAB | Refills: 0 | Status: SHIPPED | OUTPATIENT
Start: 2017-09-03 | End: 2019-04-05

## 2017-09-03 RX ADMIN — OXYCODONE HYDROCHLORIDE AND ACETAMINOPHEN 2 TABLET: 5; 325 TABLET ORAL at 00:09

## 2017-09-03 RX ADMIN — OXYCODONE HYDROCHLORIDE AND ACETAMINOPHEN 2 TABLET: 5; 325 TABLET ORAL at 17:06

## 2017-09-03 RX ADMIN — OXYCODONE HYDROCHLORIDE AND ACETAMINOPHEN 2 TABLET: 5; 325 TABLET ORAL at 13:06

## 2017-09-03 RX ADMIN — IBUPROFEN 800 MG: 400 TABLET, FILM COATED ORAL at 02:50

## 2017-09-03 RX ADMIN — IBUPROFEN 800 MG: 400 TABLET, FILM COATED ORAL at 19:35

## 2017-09-03 RX ADMIN — IBUPROFEN 800 MG: 400 TABLET, FILM COATED ORAL at 12:00

## 2017-09-03 RX ADMIN — OXYCODONE HYDROCHLORIDE AND ACETAMINOPHEN 2 TABLET: 5; 325 TABLET ORAL at 04:11

## 2017-09-03 RX ADMIN — OXYCODONE HYDROCHLORIDE AND ACETAMINOPHEN 2 TABLET: 5; 325 TABLET ORAL at 08:30

## 2017-09-03 RX ADMIN — OXYCODONE HYDROCHLORIDE AND ACETAMINOPHEN 2 TABLET: 5; 325 TABLET ORAL at 20:58

## 2017-09-03 NOTE — DISCHARGE INSTRUCTIONS
Section: What to Expect at Home    Your Recovery:  A  section, or , is surgery to deliver your baby through a cut, called an incision that the doctor makes in your lower belly and uterus. You may have some pain in your lower belly and need pain medicine for 1 to 2 weeks. You can expect some vaginal bleeding for several weeks. You will probably need about 6 weeks to fully recover. It is important to take it easy while the incision is healing. Avoid heavy lifting, strenuous activities, or exercises that strain the belly muscles while you are recovering. Ask a family member or friend for help with housework, cooking, and shopping. This care sheet gives you a general idea about how long it will take for you to recover. But each person recovers at a different pace. Follow the steps below to get better as quickly as possible. How can you care for yourself at home? Activity  · Rest when you feel tired. Getting enough sleep will help you recover. · Try to walk each day. Start by walking a little more than you did the day before. Bit by bit, increase the amount you walk. Walking boosts blood flow and helps prevent pneumonia, constipation, and blood clots. · Avoid strenuous activities, such as bicycle riding, jogging, weightlifting, and aerobic exercise, for 6 weeks or until your doctor says it is okay. · Until your doctor says it is okay, do not lift anything heavier than your baby. · Do not do sit-ups or other exercise that strain the belly muscles for 6 weeks or until your doctor says it is okay. · Hold a pillow over your incision when you cough or take deep breaths. This will support your belly and decrease your pain. · You may shower as usual. Pat the incision dry when you are done. · You will have some vaginal bleeding. Wear sanitary pads. Do not douche or use tampons until your doctor says it is okay. · Ask your doctor when you can drive again.   · You will probably need to take at least 6 weeks off work. It depends on the type of work you do and how you feel. · Wait until you are healed (about 4 to 6 weeks) before you have sexual intercourse. Your doctor will tell you when it is okay to have sex. · Talk to your doctor about birth control. You can get pregnant even before your period returns. Also, you can get pregnant while you are breast-feeding. Incision care  Your skin is your body's first line of defense against germs, but an incision site leaves an easy way for germs to enter your body. To prevent infection:  · Clean your hands frequently and before and after changing any touching any dressings. · If you have strips of tape on the incision, leave the tape on for a week or until it falls off. · Look at your incision closely every day for any changes. Contact your doctor if you experience any signs of infection, such as fever or increased redness at the surgical site. · Wash the area daily with warm, soapy water, and pat it dry. Don't use hydrogen peroxide or alcohol, which can slow healing. You may cover the area with a gauze bandage if it weeps or rubs against clothing. Change the bandage every day. · Keep the area clean and dry. Diet  · You can eat your normal diet. If your stomach is upset, try bland, low-fat foods like plain rice, broiled chicken, toast, and yogurt. · Drink plenty of fluids (unless your doctor tells you not to). · You may notice that your bowel movements are not regular right after your surgery. This is common. Try to avoid constipation and straining with bowel movements. You may want to take a fiber supplement every day. If you have not had a bowel movement after a couple of days, ask your doctor about taking a mild laxative. · If you are breast-feeding, do not drink any alcohol. Medicines  · Take pain medicines exactly as directed. · If the doctor gave you a prescription medicine for pain, take it as prescribed.   · If you are not taking a prescription pain medicine, ask your doctor if you can take an over-the-counter medicine such as acetaminophen (Tylenol), ibuprofen (Advil, Motrin), or naproxen (Aleve), for cramps. Read and follow all instructions on the label. Do not take aspirin, because it can cause more bleeding. Do not take acetaminophen (Tylenol) and other acetaminophen containing medications (i.e. Percocet) at the same time. · If you think your pain medicine is making you sick to your stomach:  · Take your medicine after meals (unless your doctor has told you not to). · Ask your doctor for a different pain medicine. · If your doctor prescribed antibiotics, take them as directed. Do not stop taking them just because you feel better. You need to take the full course of antibiotics. Mental Health  · Many women get the \"baby blues\" during the first few days after childbirth. You may lose sleep, feel irritable, and cry easily. You may feel happy one minute and sad the next. Hormone changes are one cause of these emotional changes. Also, the demands of a new baby, along with visits from relatives or other family needs, add to a mother's stress. The \"baby blues\" often peak around the fourth day. Then they ease up in less than 2 weeks. · If your moodiness or anxiety lasts for more than 2 weeks, or if you feel like life is not worth living, you may have postpartum depression. This is different for each mother. Some mothers with serious depression may worry intensely about their infant's well-being. Others may feel distant from their child. Some mothers might even feel that they might harm their baby. A mother may have signs of paranoia, wondering if someone is watching her. · With all the changes in your life, you may not know if you are depressed. Pregnancy sometimes causes changes in how you feel that are similar to the symptoms of depression.   · Symptoms of depression include:  · Feeling sad or hopeless and losing interest in daily activities. These are the most common symptoms of depression. · Sleeping too much or not enough. · Feeling tired. You may feel as if you have no energy. · Eating too much or too little. · [employer] (PSI) offers a Warm line; Chat with the Expert phone sessions; Information and Articles about Pregnancy and Postpartum Mood Disorders; Comprehensive List of Free Support Groups; Knowledgeable local coordinators who will offer support, information, and resources; Guide to Resources on Babyage; Calendar of events in the  mood disorders community; Latest News and Research; and [facility] [address] for [employer]. Remember - You are not alone; You are not to blame; With help, you will be well. 5-735-906-PPD(8473). WWW. POSTPARTUM. NET   · Writing or talking about death, such as writing suicide notes or talking about guns, knives, or pills. Keep the numbers for these national suicide hotlines: 7-896-576-TALK (9-788.732.7992) and 4-788-MZLWGAV (3-997.543.3889). If you or someone you know talks about suicide or feeling hopeless, get help right away. Other instructions  · If you breast-feed your baby, you may be more comfortable while you are healing if you place the baby so that he or she is not resting on your belly. Try tucking your baby under your arm, with his or her body along the side you will be feeding on. Support your baby's upper body with your arm. With that hand you can control your baby's head to bring his or her mouth to your breast. This is sometimes called the football hold. Follow-up care is a key part of your treatment and safety. Be sure to make and go to all appointments, and call your doctor if you are having problems. It's also a good idea to know your test results and keep a list of the medicines you take. When should you call for help? Call 911 anytime you think you may need emergency care.  For example, call if:  · You are thinking of hurting yourself, your baby, or anyone else. · You passed out (lost consciousness). · You have symptoms of a blood clot in your lung (called a pulmonary embolism). These may include:  · Sudden chest pain. · Trouble breathing. · Coughing up blood. Call your doctor now or seek immediate medical care if:    · You have severe vaginal bleeding. · You are soaking through a pad each hour for 2 or more hours. · Your vaginal bleeding seems to be getting heavier or is still bright red 4 days after delivery. · You are dizzy or lightheaded, or you feel like you may faint. · You are vomiting or cannot keep fluids down. · You have a fever. · You have new or more belly pain. · You have loose stitches, or your incision comes open. · You have symptoms of infection, such as:  · Increased pain, swelling, warmth, or redness. · Red streaks leading from the incision. · Pus draining from the incision. · A fever  · You pass tissue (not just blood). · Your vaginal discharge smells bad. · Your belly feels tender or full and hard. · Your breasts are continuously painful or red. · You feel sad, anxious, or hopeless for more than a few days. · You have sudden, severe pain in your belly. · You have symptoms of a blood clot in your leg (called a deep vein thrombosis), such as:  · Pain in your calf, back of the knee, thigh, or groin. · Redness and swelling in your leg or groin. · You have symptoms of preeclampsia, such as:  · Sudden swelling of your face, hands, or feet. · New vision problems (such as dimness or blurring). · A severe headache. · Your blood pressure is higher than it should be or rises suddenly. · You have new nausea or vomiting. Watch closely for changes in your health, and be sure to contact your doctor if you have any problems.   POSTPARTUM DISCHARGE INSTRUCTIONS       Name:  Lucía Daly  YOB: 1980  Admission Diagnosis:  Normal pregnancy in multigravida     Discharge Diagnosis: Problem List as of 9/3/2017  Date Reviewed: 2017          Codes Class Noted - Resolved    Normal pregnancy in multigravida ICD-10-CM: Z34.80  ICD-9-CM: V22.1  2017 - Present            Attending Physician:  Javier Ibrahim, DO    Delivery Type:   Section: What to Expect at Home    Your Recovery:  A  section, or , is surgery to deliver your baby through a cut, called an incision that the doctor makes in your lower belly and uterus. You may have some pain in your lower belly and need pain medicine for 1 to 2 weeks. You can expect some vaginal bleeding for several weeks. You will probably need about 6 weeks to fully recover. It is important to take it easy while the incision is healing. Avoid heavy lifting, strenuous activities, or exercises that strain the belly muscles while you are recovering. Ask a family member or friend for help with housework, cooking, and shopping. This care sheet gives you a general idea about how long it will take for you to recover. But each person recovers at a different pace. Follow the steps below to get better as quickly as possible. How can you care for yourself at home? Activity  · Rest when you feel tired. Getting enough sleep will help you recover. · Try to walk each day. Start by walking a little more than you did the day before. Bit by bit, increase the amount you walk. Walking boosts blood flow and helps prevent pneumonia, constipation, and blood clots. · Avoid strenuous activities, such as bicycle riding, jogging, weightlifting, and aerobic exercise, for 6 weeks or until your doctor says it is okay. · Until your doctor says it is okay, do not lift anything heavier than your baby. · Do not do sit-ups or other exercise that strain the belly muscles for 6 weeks or until your doctor says it is okay. · Hold a pillow over your incision when you cough or take deep breaths. This will support your belly and decrease your pain.   · You may shower as usual. Pat the incision dry when you are done. · You will have some vaginal bleeding. Wear sanitary pads. Do not douche or use tampons until your doctor says it is okay. · Ask your doctor when you can drive again. · You will probably need to take at least 6 weeks off work. It depends on the type of work you do and how you feel. · Wait until you are healed (about 4 to 6 weeks) before you have sexual intercourse. Your doctor will tell you when it is okay to have sex. · Talk to your doctor about birth control. You can get pregnant even before your period returns. Also, you can get pregnant while you are breast-feeding. Incision care  Your skin is your body's first line of defense against germs, but an incision site leaves an easy way for germs to enter your body. To prevent infection:  · Clean your hands frequently and before and after changing any touching any dressings. · If you have strips of tape on the incision, leave the tape on for a week or until it falls off. · Look at your incision closely every day for any changes. Contact your doctor if you experience any signs of infection, such as fever or increased redness at the surgical site. · Wash the area daily with warm, soapy water, and pat it dry. Don't use hydrogen peroxide or alcohol, which can slow healing. You may cover the area with a gauze bandage if it weeps or rubs against clothing. Change the bandage every day. · Keep the area clean and dry. Diet  · You can eat your normal diet. If your stomach is upset, try bland, low-fat foods like plain rice, broiled chicken, toast, and yogurt. · Drink plenty of fluids (unless your doctor tells you not to). · You may notice that your bowel movements are not regular right after your surgery. This is common. Try to avoid constipation and straining with bowel movements. You may want to take a fiber supplement every day.  If you have not had a bowel movement after a couple of days, ask your doctor about taking a mild laxative. · If you are breast-feeding, do not drink any alcohol. Medicines  · Take pain medicines exactly as directed. · If the doctor gave you a prescription medicine for pain, take it as prescribed. · If you are not taking a prescription pain medicine, ask your doctor if you can take an over-the-counter medicine such as acetaminophen (Tylenol), ibuprofen (Advil, Motrin), or naproxen (Aleve), for cramps. Read and follow all instructions on the label. Do not take aspirin, because it can cause more bleeding. Do not take acetaminophen (Tylenol) and other acetaminophen containing medications (i.e. Percocet) at the same time. · If you think your pain medicine is making you sick to your stomach:  · Take your medicine after meals (unless your doctor has told you not to). · Ask your doctor for a different pain medicine. · If your doctor prescribed antibiotics, take them as directed. Do not stop taking them just because you feel better. You need to take the full course of antibiotics. Mental Health  · Many women get the \"baby blues\" during the first few days after childbirth. You may lose sleep, feel irritable, and cry easily. You may feel happy one minute and sad the next. Hormone changes are one cause of these emotional changes. Also, the demands of a new baby, along with visits from relatives or other family needs, add to a mother's stress. The \"baby blues\" often peak around the fourth day. Then they ease up in less than 2 weeks. · If your moodiness or anxiety lasts for more than 2 weeks, or if you feel like life is not worth living, you may have postpartum depression. This is different for each mother. Some mothers with serious depression may worry intensely about their infant's well-being. Others may feel distant from their child. Some mothers might even feel that they might harm their baby.  A mother may have signs of paranoia, wondering if someone is watching her.      · With all the changes in your life, you may not know if you are depressed. Pregnancy sometimes causes changes in how you feel that are similar to the symptoms of depression. · Symptoms of depression include:  · Feeling sad or hopeless and losing interest in daily activities. These are the most common symptoms of depression. · Sleeping too much or not enough. · Feeling tired. You may feel as if you have no energy. · Eating too much or too little. · [employer] (PSI) offers a Warm line; Chat with the Expert phone sessions; Information and Articles about Pregnancy and Postpartum Mood Disorders; Comprehensive List of Free Support Groups; Knowledgeable local coordinators who will offer support, information, and resources; Guide to Resources on [employer]; Calendar of events in the  mood disorders community; Latest News and Research; and [facility] [address] for [employer]. Remember - You are not alone; You are not to blame; With help, you will be well. 4-502-451-PPD(9531). WWW. POSTPARTUM. NET   · Writing or talking about death, such as writing suicide notes or talking about guns, knives, or pills. Keep the numbers for these national suicide hotlines: 9-887-273-TALK (9-804.720.6857) and 9-339-AXIFAKV (2-581.644.8312). If you or someone you know talks about suicide or feeling hopeless, get help right away. Other instructions  · If you breast-feed your baby, you may be more comfortable while you are healing if you place the baby so that he or she is not resting on your belly. Try tucking your baby under your arm, with his or her body along the side you will be feeding on. Support your baby's upper body with your arm. With that hand you can control your baby's head to bring his or her mouth to your breast. This is sometimes called the football hold. Follow-up care is a key part of your treatment and safety.   Be sure to make and go to all appointments, and call your doctor if you are having problems. It's also a good idea to know your test results and keep a list of the medicines you take. When should you call for help? Call 911 anytime you think you may need emergency care. For example, call if:  · You are thinking of hurting yourself, your baby, or anyone else. · You passed out (lost consciousness). · You have symptoms of a blood clot in your lung (called a pulmonary embolism). These may include:  · Sudden chest pain. · Trouble breathing. · Coughing up blood. Call your doctor now or seek immediate medical care if:    · You have severe vaginal bleeding. · You are soaking through a pad each hour for 2 or more hours. · Your vaginal bleeding seems to be getting heavier or is still bright red 4 days after delivery. · You are dizzy or lightheaded, or you feel like you may faint. · You are vomiting or cannot keep fluids down. · You have a fever. · You have new or more belly pain. · You have loose stitches, or your incision comes open. · You have symptoms of infection, such as:  · Increased pain, swelling, warmth, or redness. · Red streaks leading from the incision. · Pus draining from the incision. · A fever  · You pass tissue (not just blood). · Your vaginal discharge smells bad. · Your belly feels tender or full and hard. · Your breasts are continuously painful or red. · You feel sad, anxious, or hopeless for more than a few days. · You have sudden, severe pain in your belly. · You have symptoms of a blood clot in your leg (called a deep vein thrombosis), such as:  · Pain in your calf, back of the knee, thigh, or groin. · Redness and swelling in your leg or groin. · You have symptoms of preeclampsia, such as:  · Sudden swelling of your face, hands, or feet. · New vision problems (such as dimness or blurring). · A severe headache. · Your blood pressure is higher than it should be or rises suddenly. · You have new nausea or vomiting.     Watch closely for changes in your health, and be sure to contact your doctor if you have any problems. Additional Information:  Not applicable    These are general instructions for a healthy lifestyle:    No smoking/ No tobacco products/ Avoid exposure to second hand smoke    Surgeon General's Warning:  Quitting smoking now greatly reduces serious risk to your health. Obesity, smoking, and sedentary lifestyle greatly increases your risk for illness    A healthy diet, regular physical exercise & weight monitoring are important for maintaining a healthy lifestyle    Recognize signs and symptoms of STROKE:    F-face looks uneven    A-arms unable to move or move unevenly    S-speech slurred or non-existent    T-time-call 911 as soon as signs and symptoms begin - DO NOT go       back to bed or wait to see if you get better - TIME IS BRAIN. I have had the opportunity to make my options or choices for discharge. I have received and understand these instructions.

## 2017-09-03 NOTE — ROUTINE PROCESS
Verbal/bedside report received from WES Armendariz RN using OB/ SBAR.    65 Spoke with Dr. Pako Asher notified that baby is staying because of 11% weight loss. OK to cancel discharge order.

## 2017-09-03 NOTE — LACTATION NOTE
This note was copied from a baby's chart. Mom and baby scheduled for discharge this afternoon. Baby has been very sleepy and mom is having difficulty getting the baby to maintain a latch. Mom has large nipples. I helped get the baby latched and we got her on but I could not tell if baby could keep the deep latch. Baby would suck and mom said she felt her tugging on the nipple but they baby would not start sucking again on her own. We had to keep stimulating her. I tried to entice her at the breast with a syringe of breast milk. Baby did a little better using the breast milk as enticement. We switched from left to right breast twice to try to keep baby awake. Mom is reluctant to give a lot of breast milk with syringe and finger because she is afraid that is affecting the baby latching to the breast.     I mentioned using a nipple shield to try to keep the baby latched and sucking at the breast but mom was reluctant to try that as well. Mom is pumping and is getting breast milk. Her breasts are getter firmer and she feels her milk is beginning to come in. Baby's weight loss is 10.5% this morning. We will attempt to get the baby latched every 2 hours and get a weight check this afternoon to check baby's intake. 1200 - Baby is still very sleepy at the breast. I gave mom a nipple shield to try. We put a small amount of breast milk into the shield. The baby latched and began sucking strongly. She was more vigorous at the breast. She nursed for 10 minutes and then got tired. We switched her to the other breast with the shield. We did not put any breast milk in the shield and we did hear swallowing on that side also. She nursed for about 5 minutes before she got tired and quit sucking. I gave the baby 15 cc's of breast via syringe. She has a strong suck and she took the breast milk quickly. Mom will pump and we will try her at the breast with the nipple shield at the next feeding.

## 2017-09-03 NOTE — PROGRESS NOTES
Bedside and Verbal shift change report given to Linda (oncoming nurse) by WES Armendariz (offgoing nurse). Report included the following information SBAR.

## 2017-09-03 NOTE — PROGRESS NOTES
Progress Note                               Patient: Carrie Miles MRN: [id number]  SSN: [id number]    YOB: 1980  Age: 39 y.o. Sex: female      3 Days Post-Op     Subjective:     Patient doing well postpartum without significant complaints. Voiding without difficulty. Patient reports normal lochia. . Breastfeeding: Yes     Objective:     Patient Vitals for the past 18 hrs:   Temp Pulse Resp BP   17 0837 98.4 °F (36.9 °C) - - -   17 0411 98.6 °F (37 °C) 66 14 113/56   17 2115 97.8 °F (36.6 °C) 84 14 113/67       Temp (24hrs), Av.3 °F (36.8 °C), Min:97.8 °F (36.6 °C), Max:98.6 °F (37 °C)      Physical Exam:    General:   Patient without distress. Abdomen: Soft, fundus firm at level of umbilicus, nontender   Incision: Dressing clean, dry, and intact   Lower Extremities: Negative for swelling, cords, or tenderness          Lab/Data Review: All lab results for the last 24 hours reviewed. Assessment and Plan:     Patient appears to be having an uncomplicated post- course. Continue routine postoperative care and maternal education. and Will discharge home today. Signed By: Santa Beal MD     September 3, 2017

## 2017-09-03 NOTE — ROUTINE PROCESS
Bedside shift change report given to WES Armendariz RN (oncoming nurse) by Mayda Greene. Allegra Diggs RN (offgoing nurse). Report included the following information SBAR.

## 2017-09-03 NOTE — DISCHARGE SUMMARY
Obstetrical Discharge Summary     Name: Isaac Keith MRN: [id number]  SSN: [id number]    YOB: 1980  Age: 39 y.o. Sex: female      Admit Date: 2017    Discharge Date: 2017     Admitting Physician: Len Burch DO     Attending Physician:  Len Burch DO     * Admission Diagnoses: Normal pregnancy in multigravida    * Discharge Diagnoses:   Information for the patient's :  Rollo Lieu [[id number]]   Delivery of a 3.845 kg female infant via , Low Transverse on 2017 at 8:53 AM  by . Apgars were 9 and 9. Additional Diagnoses:   Hospital Problems as of 9/3/2017  Date Reviewed: 2017          Codes Class Noted - Resolved POA    Normal pregnancy in multigravida ICD-10-CM: Z34.80  ICD-9-CM: V22.1  2017 - Present Unknown             Lab Results   Component Value Date/Time    ABO/Rh(D) A POSITIVE 2017 08:56 AM    Rubella, External immune 2017    GrBStrep, External negative 2017    GrBStrep, External positive 2017    ABO,Rh A positive 2017    ABO,Rh A positive 2017    ABO,Rh A Positive  2017      Immunization History   Administered Date(s) Administered    TB Skin Test (PPD) Intradermal 2016       * Procedures: repeat  delivery  Procedure(s) with comments:   SECTION - [facility]            * Discharge Condition: stable    * Hospital Course: Normal hospital course following the delivery. Discharged on POD #4 because baby was kept an extra day    * Disposition: Home    Discharge Medications:   Current Discharge Medication List      START taking these medications    Details   ibuprofen (MOTRIN) 800 mg tablet Take 1 Tab by mouth every eight (8) hours as needed for Pain. Qty: 30 Tab, Refills: 0         CONTINUE these medications which have CHANGED    Details   oxyCODONE-acetaminophen (PERCOCET) 5-325 mg per tablet Take 1 Tab by mouth every four (4) hours as needed. Max Daily Amount: 6 Tabs.   Qty: 30 Tab, Refills: 0         CONTINUE these medications which have NOT CHANGED    Details   PNV No12-Iron-FA-DSS-OM-3 29 mg iron-1 mg -50 mg CPKD Take  by mouth. hydrocortisone (HYCORT) 1 % ointment Apply  to affected area three (3) times daily. use thin layer  Qty: 30 g, Refills: 0      multivitamin (ONE A DAY) tablet Take 1 Tab by mouth daily. cyanocobalamin (VITAMIN B12) 500 mcg tablet Take 500 mcg by mouth daily. STOP taking these medications       acyclovir (ZOVIRAX) 400 mg tablet Comments:   Reason for Stopping:               * Follow-up Care/Patient Instructions: Activity: No sex for 6 weeks and No driving while on analgesics  Diet: Regular Diet  Wound Care: Keep wound clean and dry and As directed    Follow-up Information     Follow up With Details Comments Contact Info    A Stamplay Call As needed for breastfeeding questions or concerns. [address], [facility]  [phone]    Phys MD Lexis   Patient can only remember the practice name and not the physician             Signed By:  Jarad John MD     September 3, 2017

## 2017-09-04 VITALS
TEMPERATURE: 98 F | OXYGEN SATURATION: 98 % | BODY MASS INDEX: 36.06 KG/M2 | SYSTOLIC BLOOD PRESSURE: 121 MMHG | HEART RATE: 74 BPM | WEIGHT: 191 LBS | HEIGHT: 61 IN | DIASTOLIC BLOOD PRESSURE: 75 MMHG | RESPIRATION RATE: 14 BRPM

## 2017-09-04 PROCEDURE — 74011250637 HC RX REV CODE- 250/637: Performed by: OBSTETRICS & GYNECOLOGY

## 2017-09-04 RX ADMIN — IBUPROFEN 800 MG: 400 TABLET, FILM COATED ORAL at 04:20

## 2017-09-04 RX ADMIN — OXYCODONE HYDROCHLORIDE AND ACETAMINOPHEN 2 TABLET: 5; 325 TABLET ORAL at 04:21

## 2017-09-04 RX ADMIN — OXYCODONE HYDROCHLORIDE AND ACETAMINOPHEN 2 TABLET: 5; 325 TABLET ORAL at 08:40

## 2017-09-04 NOTE — ROUTINE PROCESS
Bedside shift change report given to Elder Joyce RN (oncoming nurse) by Jose Manuel Romero. Chidi Adan RN (offgoing nurse). Report included the following information SBAR, Kardex, Procedure Summary, Intake/Output, MAR and Recent Results.

## 2017-09-04 NOTE — PROGRESS NOTES
Post-Operative  Day 4    Salma Main       Patient doing well without significant complaints. Tolerating diet, passing flatus, voiding and ambulating without difficulty  Mom was d/c'd yesterday but ended up staying bc baby was not d/c'd    Vitals:  Visit Vitals    /75 (BP 1 Location: Left arm, BP Patient Position: At rest)    Pulse 74    Temp 98 °F (36.7 °C)    Resp 14    Ht 5' 1\" (1.549 m)    Wt 86.6 kg (191 lb)    SpO2 98%    Breastfeeding Unknown    BMI 36.09 kg/m2     Temp (24hrs), Av °F (36.7 °C), Min:97.7 °F (36.5 °C), Max:98.6 °F (37 °C)        Exam:      Patient without distress. Abdomen: soft, expected tenderness, fundus firm                Wound dressing clean, dry and                Lower extremities are nontender without edema. No cords    Labs:   Lab Results   Component Value Date/Time    WBC 9.0 2017 05:45 AM    WBC 6.8 2017 08:56 AM    WBC 10.5 2017 09:35 PM    WBC 8.0 2017 01:00 PM    WBC 4.7 2016 08:47 AM    HGB 10.3 2017 05:45 AM    HGB 12.2 2017 08:56 AM    HGB 11.3 2017 09:35 PM    HGB 12.2 2017 01:00 PM    HGB 13.2 2016 08:47 AM    HCT 31.8 2017 05:45 AM    HCT 37.8 2017 08:56 AM    HCT 34.8 2017 09:35 PM    HCT 38.1 2017 01:00 PM    HCT 40.4 2016 08:47 AM    PLATELET 207  05:45 AM    PLATELET 866  08:56 AM    PLATELET 376  09:35 PM    PLATELET 939  01:00 PM    PLATELET 154  08:47 AM       No results found for this or any previous visit (from the past 24 hour(s)). Assessment: Post-Op day 3, doing well  A+/RI    Plan:   1. Discharge home today  2. Instructions given- pelvic rest, restrictions on driving and lifting, wound care instructions, follow-up  3.  Discharge Medications: see discharge instruction sheet

## 2017-09-04 NOTE — ROUTINE PROCESS
Bedside shift change report given to WES Armendariz  (oncoming nurse) by Amirah Mcghee RN (offgoing nurse). Report included the following information SBAR.

## 2017-09-04 NOTE — LACTATION NOTE
This note was copied from a baby's chart. Mom states she is still attempting to put the baby to the breast but baby is not always latching and when she does she doesn't nurse for very long. Mom is pumping and is offering the baby breast milk in a bottle. She is pumping about 30 cc's of breast milk. I recommended that mom continue to pump every 2-3 hours and offer the baby all the breast milk she collects. Mom should continue to offer the breast at each feeding. I showed her how to do some hand expression after pumping to increase the amount of milk she collects. Mom and baby are scheduled for discharge today.

## 2017-09-28 ENCOUNTER — CLINICAL SUPPORT (OUTPATIENT)
Dept: FAMILY MEDICINE CLINIC | Age: 37
End: 2017-09-28

## 2017-09-28 VITALS
TEMPERATURE: 98.6 F | HEIGHT: 61 IN | OXYGEN SATURATION: 97 % | RESPIRATION RATE: 16 BRPM | HEART RATE: 99 BPM | SYSTOLIC BLOOD PRESSURE: 124 MMHG | BODY MASS INDEX: 29.57 KG/M2 | WEIGHT: 156.6 LBS | DIASTOLIC BLOOD PRESSURE: 85 MMHG

## 2017-09-28 DIAGNOSIS — Z23 ENCOUNTER FOR IMMUNIZATION: Primary | ICD-10-CM

## 2017-09-28 NOTE — PROGRESS NOTES
Chief Complaint   Patient presents with    Immunization/Injection     flu shot     Patient seen in the office today for flu injection

## 2017-09-28 NOTE — MR AVS SNAPSHOT
Visit Information Date & Time Provider Department Dept. Phone Encounter #  
 9/28/2017  3:45 PM Kimberley Gaines MD [address] [facility] [phone] [id number] Upcoming Health Maintenance Date Due DTaP/Tdap/Td series (1 - Tdap) 12/22/2001 PAP AKA CERVICAL CYTOLOGY 12/22/2001 INFLUENZA AGE 9 TO ADULT 8/1/2017 Allergies as of 9/28/2017  Review Complete On: 9/28/2017 By: Horace John LPN Severity Noted Reaction Type Reactions Novocain [Procaine]  08/30/2017    Other (comments) Shaking and feeling cold Current Immunizations  Never Reviewed Name Date Influenza Vaccine (Quad) PF  Incomplete TB Skin Test (PPD) Intradermal 2/19/2016 Not reviewed this visit You Were Diagnosed With   
  
 Codes Comments Encounter for immunization    -  Primary ICD-10-CM: K32 ICD-9-CM: V03.89 Vitals BP Pulse Temp Resp Height(growth percentile) Weight(growth percentile) 124/85 (BP 1 Location: Left arm, BP Patient Position: Sitting) 99 98.6 °F (37 °C) (Oral) 16 5' 1\" (1.549 m) 156 lb 9.6 oz (71 kg) SpO2 BMI OB Status Smoking Status 97% 29.59 kg/m2 Recent pregnancy Former Smoker Vitals History BMI and BSA Data Body Mass Index Body Surface Area  
 29.59 kg/m 2 1.75 m 2 Preferred Pharmacy Pharmacy Name Phone [facility] [address] [phone] Your Updated Medication List  
  
   
This list is accurate as of: 9/28/17  4:11 PM.  Always use your most recent med list.  
  
  
  
  
 cyanocobalamin 500 mcg tablet Commonly known as:  VITAMIN B12 Take 500 mcg by mouth daily. hydrocortisone 1 % ointment Commonly known as:  HYCORT Apply  to affected area three (3) times daily. use thin layer  
  
 ibuprofen 800 mg tablet Commonly known as:  MOTRIN Take 1 Tab by mouth every eight (8) hours as needed for Pain.  
  
 multivitamin tablet Commonly known as:  ONE A DAY Take 1 Tab by mouth daily. oxyCODONE-acetaminophen 5-325 mg per tablet Commonly known as:  PERCOCET Take 1 Tab by mouth every four (4) hours as needed. Max Daily Amount: 6 Tabs. PNV No12-Iron-FA-DSS-OM-3 29 mg iron-1 mg -50 mg Cpkd Take  by mouth. We Performed the Following INFLUENZA VIRUS VAC QUAD,SPLIT,PRESV FREE SYRINGE IM [id number] CPT(R)] CT IMMUNIZ ADMIN,1 SINGLE/COMB VAC/TOXOID [id number] CPT(R)] Introducing [facility]! [facility] introduces Frugoton patient portal. Now you can access parts of your medical record, email your doctor's office, and request medication refills online. 1. In your internet browser, go to [email or web address] 2. Click on the First Time User? Click Here link in the Sign In box. You will see the New Member Sign Up page. 3. Enter your Frugoton Access Code exactly as it appears below. You will not need to use this code after youve completed the sign-up process. If you do not sign up before the expiration date, you must request a new code. · Frugoton Access Code: [email or web address] Expires: 12/27/2017  4:11 PM 
 
4. Enter the last four digits of your Social Security Number (xxxx) and Date of Birth (mm/dd/yyyy) as indicated and click Submit. You will be taken to the next sign-up page. 5. Create a Wan Shidao managementt ID. This will be your Frugoton login ID and cannot be changed, so think of one that is secure and easy to remember. 6. Create a Frugoton password. You can change your password at any time. 7. Enter your Password Reset Question and Answer. This can be used at a later time if you forget your password. 8. Enter your e-mail address. You will receive e-mail notification when new information is available in [address] 9. Click Sign Up. You can now view and download portions of your medical record.  
10. Click the Download Summary menu link to download a portable copy of your medical information. If you have questions, please visit the Frequently Asked Questions section of the Purchext website. Remember, Purchext is NOT to be used for urgent needs. For medical emergencies, dial 911. Now available from your iPhone and Android! Please provide this summary of care documentation to your next provider. Your primary care clinician is listed as Phys Other. If you have any questions after today's visit, please call [phone].

## 2017-09-28 NOTE — PROGRESS NOTES
Written order received to administer 0.5 ml Influenza Vaccine Flulaval Quadrivalent 2017/2018 Formula. After obtaining written consent from the patient,  Flu vaccine was administered to right  deltoid by Aydee Sutherland LPN. NDC: 76513-482-51 Lot: [id number], Exp: 04/30/18 Manf: Behind the [employer].  Patient provided VIS & observed with no s/s of adverse reactions.

## 2017-11-28 ENCOUNTER — OFFICE VISIT (OUTPATIENT)
Dept: FAMILY MEDICINE CLINIC | Age: 37
End: 2017-11-28

## 2017-11-28 VITALS
BODY MASS INDEX: 31.91 KG/M2 | RESPIRATION RATE: 18 BRPM | HEART RATE: 78 BPM | WEIGHT: 169 LBS | HEIGHT: 61 IN | TEMPERATURE: 98 F | SYSTOLIC BLOOD PRESSURE: 120 MMHG | DIASTOLIC BLOOD PRESSURE: 77 MMHG | OXYGEN SATURATION: 98 %

## 2017-11-28 DIAGNOSIS — M79.605 LEFT LEG PAIN: Primary | ICD-10-CM

## 2017-11-28 DIAGNOSIS — M89.8X1 PAIN OF LEFT SCAPULA: ICD-10-CM

## 2017-11-28 RX ORDER — CYCLOBENZAPRINE HCL 10 MG
10 TABLET ORAL
Qty: 30 TAB | Refills: 0 | Status: SHIPPED | OUTPATIENT
Start: 2017-11-28 | End: 2019-04-05

## 2017-11-28 RX ORDER — IBUPROFEN 800 MG/1
800 TABLET ORAL
Qty: 60 TAB | Refills: 0 | Status: SHIPPED | OUTPATIENT
Start: 2017-11-28 | End: 2017-12-29 | Stop reason: SDUPTHER

## 2017-11-28 NOTE — PROGRESS NOTES
Pt here c/o intermittent pain in left shoulder and leg x 12 weeks. Reports pain started after having an spinal for labor. Pt had a . Pt states pain does not seem to be improving. Pt has discussed pain with her ob who advised her that it should improve and provided back stretches. Pt has taken ibuprofen with minimal relief. Pt is breastfeeding. Subjective: (As above and below)     Chief Complaint   Patient presents with    Shoulder Pain    Leg Pain     she is a 39y.o. year old female who presents for evaluation. Reviewed PmHx, RxHx, FmHx, SocHx, AllgHx and updated in chart. Review of Systems - negative except as listed above    Objective:     Vitals:    17 0811   BP: 120/77   Pulse: 78   Resp: 18   Temp: 98 °F (36.7 °C)   TempSrc: Oral   SpO2: 98%   Weight: 169 lb (76.7 kg)   Height: 5' 1\" (1.549 m)     Physical Examination: General appearance - alert, well appearing, and in no distress  Mental status - normal mood, behavior, speech, dress, motor activity, and thought processes  Mouth - mucous membranes moist, pharynx normal without lesions  Chest - clear to auscultation, no wheezes, rales or rhonchi, symmetric air entry  Heart - normal rate, regular rhythm, normal S1, S2, no murmurs, rubs, clicks or gallops  Musculoskeletal - tenderness with internal rotation of the left hip, tender along medical left scapula    Assessment/ Plan:   1. Left leg pain  -motrin, flexeril as written  -refer to PT    2. Pain of left scapula  -see above  -all pain seems to be related to recent spinal     Follow-up Disposition: As needed  I have discussed the diagnosis with the patient and the intended plan as seen in the above orders. The patient has received an after-visit summary and questions were answered concerning future plans.      Medication Side Effects and Warnings were discussed with patient: yes  Patient Labs were reviewed: yes  Patient Past Records were reviewed:  yes    Jorge Mays M.D.

## 2017-11-28 NOTE — MR AVS SNAPSHOT
Visit Information Date & Time Provider Department Dept. Phone Encounter #  
 11/28/2017  8:20 AM Olga Quinn MD [address] [facility] [phone] [id number] Upcoming Health Maintenance Date Due DTaP/Tdap/Td series (1 - Tdap) 12/22/2001 PAP AKA CERVICAL CYTOLOGY 12/22/2001 Allergies as of 11/28/2017  Review Complete On: 11/28/2017 By: Olga Quinn MD  
  
 Severity Noted Reaction Type Reactions Novocain [Procaine]  08/30/2017    Other (comments) Shaking and feeling cold Current Immunizations  Never Reviewed Name Date Influenza Vaccine (Quad) PF 9/28/2017  3:56 PM  
 TB Skin Test (PPD) Intradermal 2/19/2016 Not reviewed this visit You Were Diagnosed With   
  
 Codes Comments Left leg pain    -  Primary ICD-10-CM: M79.605 ICD-9-CM: 729.5 Pain of left scapula     ICD-10-CM: M89.8X1 ICD-9-CM: 733.90 Vitals BP Pulse Temp Resp Height(growth percentile) Weight(growth percentile) 120/77 (BP 1 Location: Left arm, BP Patient Position: Sitting) 78 98 °F (36.7 °C) (Oral) 18 5' 1\" (1.549 m) 169 lb (76.7 kg) SpO2 BMI OB Status Smoking Status 98% 31.93 kg/m2 Recent pregnancy Former Smoker Vitals History BMI and BSA Data Body Mass Index Body Surface Area  
 31.93 kg/m 2 1.82 m 2 Preferred Pharmacy Pharmacy Name Phone [id number] , [address] [facility] [phone] Your Updated Medication List  
  
   
This list is accurate as of: 11/28/17  8:56 AM.  Always use your most recent med list.  
  
  
  
  
 cyanocobalamin 500 mcg tablet Commonly known as:  VITAMIN B12 Take 500 mcg by mouth daily. cyclobenzaprine 10 mg tablet Commonly known as:  FLEXERIL Take 1 Tab by mouth nightly. hydrocortisone 1 % ointment Commonly known as:  HYCORT Apply  to affected area three (3) times daily. use thin layer  
  
 ibuprofen 800 mg tablet Commonly known as:  MOTRIN Take 1 Tab by mouth every eight (8) hours as needed for Pain.  
  
 multivitamin tablet Commonly known as:  ONE A DAY Take 1 Tab by mouth daily. oxyCODONE-acetaminophen 5-325 mg per tablet Commonly known as:  PERCOCET Take 1 Tab by mouth every four (4) hours as needed. Max Daily Amount: 6 Tabs. PNV No12-Iron-FA-DSS-OM-3 29 mg iron-1 mg -50 mg Cpkd Take  by mouth. Prescriptions Sent to Pharmacy Refills  
 cyclobenzaprine (FLEXERIL) 10 mg tablet 0 Sig: Take 1 Tab by mouth nightly. Class: Normal  
 Pharmacy: [id number] - [place], [address] Ph #: [phone] Route: Oral  
 ibuprofen (MOTRIN) 800 mg tablet 0 Sig: Take 1 Tab by mouth every eight (8) hours as needed for Pain. Class: Normal  
 Pharmacy: [id number] - [place], [address] Ph #: [phone] Route: Oral  
  
We Performed the Following REFERRAL TO PHYSICAL THERAPY [TBL41 Custom] Referral Information Referral ID Referred By Referred To  
  
 [id number] Elias PAULA, PT   
   [address] Phone: [phone] Fax: [phone] Visits Status Start Date End Date 1 New Request 11/28/17 11/28/18 If your referral has a status of pending review or denied, additional information will be sent to support the outcome of this decision. Introducing [facility]! [facility] introduces Oration patient portal. Now you can access parts of your medical record, email your doctor's office, and request medication refills online. 1. In your internet browser, go to [email or web address] 2. Click on the First Time User? Click Here link in the Sign In box. You will see the New Member Sign Up page. 3. Enter your Oration Access Code exactly as it appears below. You will not need to use this code after youve completed the sign-up process.  If you do not sign up before the expiration date, you must request a new code. · DxUpClose Access Code: [email or web address] Expires: 12/27/2017  3:11 PM 
 
4. Enter the last four digits of your Social Security Number (xxxx) and Date of Birth (mm/dd/yyyy) as indicated and click Submit. You will be taken to the next sign-up page. 5. Create a DxUpClose ID. This will be your DxUpClose login ID and cannot be changed, so think of one that is secure and easy to remember. 6. Create a DxUpClose password. You can change your password at any time. 7. Enter your Password Reset Question and Answer. This can be used at a later time if you forget your password. 8. Enter your e-mail address. You will receive e-mail notification when new information is available in [address] 9. Click Sign Up. You can now view and download portions of your medical record. 10. Click the Download Summary menu link to download a portable copy of your medical information. If you have questions, please visit the Frequently Asked Questions section of the DxUpClose website. Remember, DxUpClose is NOT to be used for urgent needs. For medical emergencies, dial 911. Now available from your iPhone and Android! Please provide this summary of care documentation to your next provider. Your primary care clinician is listed as Phys Other. If you have any questions after today's visit, please call [phone].

## 2017-11-28 NOTE — PROGRESS NOTES
Pt here c/o intermittent pain in left shoulder and leg x 12 weeks. Reports pain started after having a spinal tap. Pt states pain does not seem to be improving.

## 2017-12-29 RX ORDER — IBUPROFEN 800 MG/1
TABLET ORAL
Qty: 60 TAB | Refills: 0 | Status: SHIPPED | OUTPATIENT
Start: 2017-12-29 | End: 2018-01-30 | Stop reason: SDUPTHER

## 2018-01-30 RX ORDER — IBUPROFEN 800 MG/1
TABLET ORAL
Qty: 60 TAB | Refills: 0 | Status: SHIPPED | OUTPATIENT
Start: 2018-01-30 | End: 2020-04-30 | Stop reason: SDUPTHER

## 2018-05-14 ENCOUNTER — OFFICE VISIT (OUTPATIENT)
Dept: FAMILY MEDICINE CLINIC | Age: 38
End: 2018-05-14

## 2018-05-14 VITALS
RESPIRATION RATE: 19 BRPM | OXYGEN SATURATION: 97 % | HEIGHT: 61 IN | DIASTOLIC BLOOD PRESSURE: 71 MMHG | WEIGHT: 163.4 LBS | HEART RATE: 85 BPM | SYSTOLIC BLOOD PRESSURE: 113 MMHG | BODY MASS INDEX: 30.85 KG/M2 | TEMPERATURE: 98.7 F

## 2018-05-14 DIAGNOSIS — Z11.3 SCREEN FOR STD (SEXUALLY TRANSMITTED DISEASE): ICD-10-CM

## 2018-05-14 DIAGNOSIS — Z72.51 UNPROTECTED SEX: ICD-10-CM

## 2018-05-14 DIAGNOSIS — R30.9 PAIN WITH URINATION: ICD-10-CM

## 2018-05-14 DIAGNOSIS — J02.9 SORETHROAT: Primary | ICD-10-CM

## 2018-05-14 LAB
BILIRUB UR QL STRIP: NEGATIVE
GLUCOSE UR-MCNC: NEGATIVE MG/DL
HCG URINE, QL. (POC): NEGATIVE
KETONES P FAST UR STRIP-MCNC: NEGATIVE MG/DL
PH UR STRIP: 6 [PH] (ref 4.6–8)
PROT UR QL STRIP: NEGATIVE
S PYO AG THROAT QL: NEGATIVE
SP GR UR STRIP: 1.03 (ref 1–1.03)
UA UROBILINOGEN AMB POC: NORMAL (ref 0.2–1)
URINALYSIS CLARITY POC: CLEAR
URINALYSIS COLOR POC: YELLOW
URINE BLOOD POC: NORMAL
URINE LEUKOCYTES POC: NEGATIVE
URINE NITRITES POC: NEGATIVE
VALID INTERNAL CONTROL?: YES
VALID INTERNAL CONTROL?: YES

## 2018-05-14 NOTE — PROGRESS NOTES
Chief Complaint   Patient presents with    Sore Throat    Nausea    Urinary Pain     burining on urination    Labs     Std testing    Pregnancy Test    Exposure to STD     unprotected intercourse     Pt seen in the office today for c/o of sore throat on and off for the past 1 month  Reports exposure to strep at her work place  Also has c/o of pain on urination& frequency with nausea  Requesting STD testing and pregnancy testing. She states she recently had unprotected sex with her child's father    Subjective: (As above and below)     Chief Complaint   Patient presents with    Sore Throat    Nausea    Urinary Pain     burining on urination    Labs     Std testing    Pregnancy Test    Exposure to STD     unprotected intercourse     she is a 40y.o. year old female who presents for evaluation. Reviewed PmHx, RxHx, FmHx, SocHx, AllgHx and updated in chart. Review of Systems - negative except as listed above    Objective:     Vitals:    05/14/18 1145   BP: 113/71   Pulse: 85   Resp: 19   Temp: 98.7 °F (37.1 °C)   TempSrc: Oral   SpO2: 97%   Weight: 163 lb 6.4 oz (74.1 kg)   Height: 5' 1\" (1.549 m)     Physical Examination: General appearance - alert, well appearing, and in no distress  Mental status - normal mood, behavior, speech, dress, motor activity, and thought processes  Mouth - mucous membranes moist, pharynx normal without lesions  Chest - clear to auscultation, no wheezes, rales or rhonchi, symmetric air entry  Heart - normal rate, regular rhythm, normal S1, S2, no murmurs, rubs, clicks or gallops  Musculoskeletal - no joint tenderness, deformity or swelling  Extremities - peripheral pulses normal, no pedal edema, no clubbing or cyanosis    Assessment/ Plan:   1. Sorethroat  -neg, exam normal  - AMB POC RAPID STREP A    2. Pain with urination  -check culture, Sxs intermittent, UA shows trace blood only  - AMB POC URINALYSIS DIP STICK MANUAL W/O MICRO  - CULTURE, URINE    3.  Unprotected sex  -preg neg  -check labs and swab for STDs  - AMB POC URINE PREGNANCY TEST, VISUAL COLOR COMPARISON    4. Screen for STD (sexually transmitted disease)  - HIV 1/2 AG/AB, 4TH GENERATION,W RFLX CONFIRM  - HERPES SIMPLEX VIRUS TYPES 1/2 SPECIFIC AB, IGG  - RPR  - HEPATITIS C AB     Follow-up Disposition: As needed  I have discussed the diagnosis with the patient and the intended plan as seen in the above orders. The patient has received an after-visit summary and questions were answered concerning future plans.      Medication Side Effects and Warnings were discussed with patient: yes  Patient Labs were reviewed: yes  Patient Past Records were reviewed:  yes    Trevor Torres M.D.

## 2018-05-14 NOTE — MR AVS SNAPSHOT
[address] [address] 
[phone] Patient: Rhoda Figueroa MRN: [id number] CHB:[id number] Visit Information Date & Time Provider Department Dept. Phone Encounter #  
 5/14/2018 11:20 AM Poonam Gasca MD [id number] [facility] [phone] [id number] Upcoming Health Maintenance Date Due DTaP/Tdap/Td series (1 - Tdap) 12/22/2001 PAP AKA CERVICAL CYTOLOGY 12/22/2001 Influenza Age 5 to Adult 8/1/2018 Allergies as of 5/14/2018  Review Complete On: 5/14/2018 By: Poonam Gasca MD  
  
 Severity Noted Reaction Type Reactions Novocain [Procaine]  08/30/2017    Other (comments) Shaking and feeling cold Current Immunizations  Never Reviewed Name Date Influenza Vaccine (Quad) PF 9/28/2017  3:56 PM  
 TB Skin Test (PPD) Intradermal 2/19/2016 Not reviewed this visit You Were Diagnosed With   
  
 Codes Comments Sorethroat    -  Primary ICD-10-CM: J02.9 ICD-9-CM: 850 Pain with urination     ICD-10-CM: R30.9 ICD-9-CM: 788.1 Unprotected sex     ICD-10-CM: Z72.51 
ICD-9-CM: V69.2 Screen for STD (sexually transmitted disease)     ICD-10-CM: Z11.3 ICD-9-CM: V74.5 Vitals BP Pulse Temp Resp Height(growth percentile) Weight(growth percentile) 113/71 (BP 1 Location: Left arm, BP Patient Position: Sitting) 85 98.7 °F (37.1 °C) (Oral) 19 5' 1\" (1.549 m) 163 lb 6.4 oz (74.1 kg) SpO2 BMI OB Status Smoking Status 97% 30.87 kg/m2 Recent pregnancy Former Smoker Vitals History BMI and BSA Data Body Mass Index Body Surface Area  
 30.87 kg/m 2 1.79 m 2 Preferred Pharmacy Pharmacy Name Phone [id number], [address] [phone] Your Updated Medication List  
  
   
This list is accurate as of 5/14/18 12:19 PM.  Always use your most recent med list.  
  
  
  
  
 cyanocobalamin 500 mcg tablet Commonly known as:  VITAMIN B12 Take 500 mcg by mouth daily. cyclobenzaprine 10 mg tablet Commonly known as:  FLEXERIL Take 1 Tab by mouth nightly. hydrocortisone 1 % ointment Commonly known as:  HYCORT Apply  to affected area three (3) times daily. use thin layer  
  
 ibuprofen 800 mg tablet Commonly known as:  MOTRIN  
TAKE 1 TAB BY MOUTH EVERY EIGHT (8) HOURS AS NEEDED FOR PAIN.  
  
 multivitamin tablet Commonly known as:  ONE A DAY Take 1 Tab by mouth daily. oxyCODONE-acetaminophen 5-325 mg per tablet Commonly known as:  PERCOCET Take 1 Tab by mouth every four (4) hours as needed. Max Daily Amount: 6 Tabs. PNV No12-Iron-FA-DSS-OM-3 29 mg iron-1 mg -50 mg Cpkd Take  by mouth. We Performed the Following AMB POC RAPID STREP A [67611 CPT(R)] AMB POC URINALYSIS DIP STICK MANUAL W/O MICRO [93788 CPT(R)] AMB POC URINE PREGNANCY TEST, VISUAL COLOR COMPARISON [46786 CPT(R)] CULTURE, URINE [id number] CPT(R)] HEPATITIS C AB [55683 CPT(R)] HERPES SIMPLEX VIRUS TYPES 1/2 SPECIFIC AB, IGG [[id number] Custom] HIV 1/2 AG/AB, 4TH GENERATION,W RFLX CONFIRM [id number] CPT(R)] RPR [89934 CPT(R)] Introducing \[facility] [place] [facility]\"" & HEALTH SERVICES! Maria Dupont introduces [employer] patient portal. Now you can access parts of your medical record, email your doctor's office, and request medication refills online. 1. In your internet browser, go to [email or web address]. [employer] 2. Click on the First Time User? Click Here link in the Sign In box. You will see the New Member Sign Up page. 3. Enter your [employer] Access Code exactly as it appears below. You will not need to use this code after youve completed the sign-up process. If you do not sign up before the expiration date, you must request a new code. · [employer] Access Code: [email or web address] Expires: 8/12/2018 12:19 PM 
 
4.  Enter the last four digits of your Social Security Number (xxxx) and Date of Birth (mm/dd/yyyy) as indicated and click Submit. You will be taken to the next sign-up page. 5. Create a Markado ID. This will be your [employer] login ID and cannot be changed, so think of one that is secure and easy to remember. 6. Create a [employer] password. You can change your password at any time. 7. Enter your Password Reset Question and Answer. This can be used at a later time if you forget your password. 8. Enter your e-mail address. You will receive e-mail notification when new information is available in [address] 9. Click Sign Up. You can now view and download portions of your medical record. 10. Click the Download Summary menu link to download a portable copy of your medical information. If you have questions, please visit the Frequently Asked Questions section of the [employer] website. Remember, [employer] is NOT to be used for urgent needs. For medical emergencies, dial 911. Now available from your iPhone and Android! Please provide this summary of care documentation to your next provider. Your primary care clinician is listed as Phys Other. If you have any questions after today's visit, please call [phone].

## 2018-05-15 LAB
BACTERIA UR CULT: NORMAL
HCV AB S/CO SERPL IA: <0.1 S/CO RATIO (ref 0–0.9)
HIV 1+2 AB+HIV1 P24 AG SERPL QL IA: NON REACTIVE
HSV1 IGG SER IA-ACNC: <0.91 INDEX (ref 0–0.9)
HSV2 IGG SER IA-ACNC: 17.6 INDEX (ref 0–0.9)
RPR SER QL: NON REACTIVE

## 2018-11-15 ENCOUNTER — APPOINTMENT (OUTPATIENT)
Dept: ULTRASOUND IMAGING | Age: 38
End: 2018-11-15
Attending: EMERGENCY MEDICINE
Payer: SELF-PAY

## 2018-11-15 ENCOUNTER — HOSPITAL ENCOUNTER (EMERGENCY)
Age: 38
Discharge: HOME | End: 2018-11-15
Attending: EMERGENCY MEDICINE | Admitting: EMERGENCY MEDICINE
Payer: SELF-PAY

## 2018-11-15 VITALS
TEMPERATURE: 97.5 F | HEIGHT: 61 IN | HEART RATE: 94 BPM | OXYGEN SATURATION: 98 % | SYSTOLIC BLOOD PRESSURE: 115 MMHG | WEIGHT: 164 LBS | DIASTOLIC BLOOD PRESSURE: 72 MMHG | BODY MASS INDEX: 30.96 KG/M2 | RESPIRATION RATE: 18 BRPM

## 2018-11-15 DIAGNOSIS — R10.9 ACUTE ABDOMINAL PAIN: Primary | ICD-10-CM

## 2018-11-15 LAB
ALBUMIN SERPL-MCNC: 3.9 G/DL (ref 3.5–5)
ALBUMIN/GLOB SERPL: 1.1 {RATIO} (ref 1.1–2.2)
ALP SERPL-CCNC: 133 U/L (ref 45–117)
ALT SERPL-CCNC: 22 U/L (ref 12–78)
ANION GAP SERPL CALC-SCNC: 10 MMOL/L (ref 5–15)
APPEARANCE UR: CLEAR
AST SERPL-CCNC: 18 U/L (ref 15–37)
BACTERIA URNS QL MICRO: ABNORMAL /HPF
BASOPHILS # BLD: 0 K/UL (ref 0–0.1)
BASOPHILS NFR BLD: 0 % (ref 0–1)
BILIRUB SERPL-MCNC: 0.4 MG/DL (ref 0.2–1)
BILIRUB UR QL CFM: NEGATIVE
BUN SERPL-MCNC: 11 MG/DL (ref 6–20)
BUN/CREAT SERPL: 19 (ref 12–20)
CALCIUM SERPL-MCNC: 9.5 MG/DL (ref 8.5–10.1)
CHLORIDE SERPL-SCNC: 103 MMOL/L (ref 97–108)
CO2 SERPL-SCNC: 28 MMOL/L (ref 21–32)
COLOR UR: ABNORMAL
COMMENT, HOLDF: NORMAL
CREAT SERPL-MCNC: 0.57 MG/DL (ref 0.55–1.02)
DIFFERENTIAL METHOD BLD: ABNORMAL
EOSINOPHIL # BLD: 0.2 K/UL (ref 0–0.4)
EOSINOPHIL NFR BLD: 3 % (ref 0–7)
EPITH CASTS URNS QL MICRO: ABNORMAL /LPF
ERYTHROCYTE [DISTWIDTH] IN BLOOD BY AUTOMATED COUNT: 13.6 % (ref 11.5–14.5)
GLOBULIN SER CALC-MCNC: 3.7 G/DL (ref 2–4)
GLUCOSE SERPL-MCNC: 107 MG/DL (ref 65–100)
GLUCOSE UR STRIP.AUTO-MCNC: NEGATIVE MG/DL
HCG SERPL QL: NEGATIVE
HCT VFR BLD AUTO: 46 % (ref 35–47)
HGB BLD-MCNC: 14.3 G/DL (ref 11.5–16)
HGB UR QL STRIP: NEGATIVE
IMM GRANULOCYTES # BLD: 0 K/UL (ref 0–0.04)
IMM GRANULOCYTES NFR BLD AUTO: 0 % (ref 0–0.5)
KETONES UR QL STRIP.AUTO: ABNORMAL MG/DL
LEUKOCYTE ESTERASE UR QL STRIP.AUTO: NEGATIVE
LIPASE SERPL-CCNC: 120 U/L (ref 73–393)
LYMPHOCYTES # BLD: 1.9 K/UL (ref 0.8–3.5)
LYMPHOCYTES NFR BLD: 27 % (ref 12–49)
MCH RBC QN AUTO: 24.7 PG (ref 26–34)
MCHC RBC AUTO-ENTMCNC: 31.1 G/DL (ref 30–36.5)
MCV RBC AUTO: 79.6 FL (ref 80–99)
MONOCYTES # BLD: 0.4 K/UL (ref 0–1)
MONOCYTES NFR BLD: 5 % (ref 5–13)
MUCOUS THREADS URNS QL MICRO: ABNORMAL /LPF
NEUTS SEG # BLD: 4.7 K/UL (ref 1.8–8)
NEUTS SEG NFR BLD: 66 % (ref 32–75)
NITRITE UR QL STRIP.AUTO: NEGATIVE
NRBC # BLD: 0 K/UL (ref 0–0.01)
NRBC BLD-RTO: 0 PER 100 WBC
PH UR STRIP: 6 [PH] (ref 5–8)
PLATELET # BLD AUTO: 370 K/UL (ref 150–400)
PMV BLD AUTO: 10.1 FL (ref 8.9–12.9)
POTASSIUM SERPL-SCNC: 3.9 MMOL/L (ref 3.5–5.1)
PROT SERPL-MCNC: 7.6 G/DL (ref 6.4–8.2)
PROT UR STRIP-MCNC: ABNORMAL MG/DL
RBC # BLD AUTO: 5.78 M/UL (ref 3.8–5.2)
RBC #/AREA URNS HPF: ABNORMAL /HPF (ref 0–5)
SAMPLES BEING HELD,HOLD: NORMAL
SODIUM SERPL-SCNC: 141 MMOL/L (ref 136–145)
SP GR UR REFRACTOMETRY: 1.03 (ref 1–1.03)
UR CULT HOLD, URHOLD: NORMAL
UROBILINOGEN UR QL STRIP.AUTO: 1 EU/DL (ref 0.2–1)
WBC # BLD AUTO: 7.2 K/UL (ref 3.6–11)
WBC URNS QL MICRO: ABNORMAL /HPF (ref 0–4)

## 2018-11-15 PROCEDURE — 84703 CHORIONIC GONADOTROPIN ASSAY: CPT

## 2018-11-15 PROCEDURE — 80053 COMPREHEN METABOLIC PANEL: CPT

## 2018-11-15 PROCEDURE — 74011250636 HC RX REV CODE- 250/636: Performed by: EMERGENCY MEDICINE

## 2018-11-15 PROCEDURE — 99284 EMERGENCY DEPT VISIT MOD MDM: CPT

## 2018-11-15 PROCEDURE — 81001 URINALYSIS AUTO W/SCOPE: CPT

## 2018-11-15 PROCEDURE — 93005 ELECTROCARDIOGRAM TRACING: CPT

## 2018-11-15 PROCEDURE — 36415 COLL VENOUS BLD VENIPUNCTURE: CPT

## 2018-11-15 PROCEDURE — 83690 ASSAY OF LIPASE: CPT

## 2018-11-15 PROCEDURE — 85025 COMPLETE CBC W/AUTO DIFF WBC: CPT

## 2018-11-15 PROCEDURE — 96375 TX/PRO/DX INJ NEW DRUG ADDON: CPT

## 2018-11-15 PROCEDURE — 76856 US EXAM PELVIC COMPLETE: CPT

## 2018-11-15 PROCEDURE — 96374 THER/PROPH/DIAG INJ IV PUSH: CPT

## 2018-11-15 PROCEDURE — 76830 TRANSVAGINAL US NON-OB: CPT

## 2018-11-15 RX ORDER — ONDANSETRON 4 MG/1
8 TABLET, ORALLY DISINTEGRATING ORAL
Status: DISCONTINUED | OUTPATIENT
Start: 2018-11-15 | End: 2018-11-15

## 2018-11-15 RX ORDER — ONDANSETRON 2 MG/ML
4 INJECTION INTRAMUSCULAR; INTRAVENOUS
Status: COMPLETED | OUTPATIENT
Start: 2018-11-15 | End: 2018-11-15

## 2018-11-15 RX ORDER — IBUPROFEN 600 MG/1
600 TABLET ORAL
Status: DISCONTINUED | OUTPATIENT
Start: 2018-11-15 | End: 2018-11-15

## 2018-11-15 RX ORDER — NAPROXEN 500 MG/1
500 TABLET ORAL
Qty: 30 TAB | Refills: 0 | Status: SHIPPED | OUTPATIENT
Start: 2018-11-15 | End: 2019-04-05

## 2018-11-15 RX ORDER — KETOROLAC TROMETHAMINE 30 MG/ML
15 INJECTION, SOLUTION INTRAMUSCULAR; INTRAVENOUS
Status: COMPLETED | OUTPATIENT
Start: 2018-11-15 | End: 2018-11-15

## 2018-11-15 RX ADMIN — ONDANSETRON 4 MG: 2 INJECTION INTRAMUSCULAR; INTRAVENOUS at 21:43

## 2018-11-15 RX ADMIN — KETOROLAC TROMETHAMINE 15 MG: 30 INJECTION, SOLUTION INTRAMUSCULAR at 21:44

## 2018-11-15 NOTE — LETTER
[address] 
[facility] EMERGENCY DEPT 
[address] [place] [address] 
[phone] Work/School Note Date: 11/15/2018 To Whom It May concern: Matthew Gtz was seen and treated today in the emergency room by the following provider(s): 
Attending Provider: Duglas Perez DO. Matthew Gtz may return to work on 11/17/2018. Sincerely, Meg Rendon RN

## 2018-11-16 LAB
ATRIAL RATE: 71 BPM
CALCULATED P AXIS, ECG09: 59 DEGREES
CALCULATED R AXIS, ECG10: 29 DEGREES
CALCULATED T AXIS, ECG11: 29 DEGREES
DIAGNOSIS, 93000: NORMAL
P-R INTERVAL, ECG05: 134 MS
Q-T INTERVAL, ECG07: 390 MS
QRS DURATION, ECG06: 82 MS
QTC CALCULATION (BEZET), ECG08: 423 MS
VENTRICULAR RATE, ECG03: 71 BPM

## 2018-11-16 NOTE — DISCHARGE INSTRUCTIONS
Pelvic Pain: Care Instructions  Your Care Instructions    Pelvic pain, or pain in the lower belly, can have many causes. Often pelvic pain is not serious and gets better in a few days. If your pain continues or gets worse, you may need tests and treatment. Tell your doctor about any new symptoms. These may be signs of a serious problem. Follow-up care is a key part of your treatment and safety. Be sure to make and go to all appointments, and call your doctor if you are having problems. It's also a good idea to know your test results and keep a list of the medicines you take. How can you care for yourself at home? · Rest until you feel better. Lie down, and raise your legs by placing a pillow under your knees. · Drink plenty of fluids. You may find that small, frequent sips are easier on your stomach than if you drink a lot at once. Avoid drinks with carbonation or caffeine, such as soda pop, tea, or coffee. · Try eating several small meals instead of 2 or 3 large ones. Eat mild foods, such as rice, dry toast or crackers, bananas, and applesauce. Avoid fatty and spicy foods, other fruits, and alcohol until 48 hours after your symptoms have gone away. · Take an over-the-counter pain medicine, such as acetaminophen (Tylenol), ibuprofen (Advil, Motrin), or naproxen (Aleve). Read and follow all instructions on the label. · Do not take two or more pain medicines at the same time unless the doctor told you to. Many pain medicines have acetaminophen, which is Tylenol. Too much acetaminophen (Tylenol) can be harmful. · You can put a heating pad, a warm cloth, or moist heat on your belly to relieve pain. When should you call for help?   Call your doctor now or seek immediate medical care if:    · You have a new or higher fever.     · You have unusual vaginal bleeding.     · You have new or worse belly or pelvic pain.     · You have vaginal discharge that has increased in amount or smells bad.    Watch closely for changes in your health, and be sure to contact your doctor if:    · You do not get better as expected. Where can you learn more? Go to http://hafsa-ary.info/. Enter [phone] in the search box to learn more about \"Pelvic Pain: Care Instructions. \"  Current as of: May 15, 2018  Content Version: 11.8  © 2594-4536 [employer]. Care instructions adapted under license by [employer] (which disclaims liability or warranty for this information). If you have questions about a medical condition or this instruction, always ask your healthcare professional. [address] [address] any warranty or liability for your use of this information.

## 2018-11-16 NOTE — ED TRIAGE NOTES
Pt arrives with c/o of abdominal pain since august after a D/C procedure pain has gotten worse this evening.

## 2018-11-16 NOTE — ED PROVIDER NOTES
abd pain since August when she had a miscarriage HAD D&C in August at [facility] by Dr Kylah Mcmillan. Got a depot provera shot Aug 17 Sept - no pain except for intermittent abd cramping. October- had period whole month Pain worse today. Came by EMS. Central/diffuse. Goes up to back and chest. 
abd pain everyday this week. 9:10 PM 
I have evaluated the patient as the Provider in Triage. I have reviewed Her vital signs and the triage nurse assessment. I have talked with the patient and any available family and advised that I am the provider in triage and have ordered the appropriate study to initiate their work up based on the clinical presentation during my assessment. I have advised that the patient will be accommodated in the Main ED as soon as possible. I have also requested to contact the triage nurse or myself immediately if the patient experiences any changes in their condition during this brief waiting period. Yoanabaron Buck, DO 
 
10:51 PM 
 
Femi Neumann is a 40 y.o. female who presents to the ED via EMS with a c/o central abdominal pain which dacia been constant for 1 week. At onset, pt's pain was an 8/10 in severity and she described it as \"pressure\". Pt reports that she has had intermittent abdominal pain for \" a few months\" but states that her pain has been progressively worse and constant for the past week. Of note, pt states that she got the DepoShot on 8/15/18. She additionally c/o intermittent n/v/d. Pt specifically denies chest pain, shortness of breath,  fever, numbness, tingling, back pain, cough, leg swelling, dizziness or any other acute sx. Pt denies any recent travel, known sick contacts, or recent illness. PCP: Ramos Mays MD 
PMHx significant for: Headaches, Genital herpes, Chlamydia PSHx significant for:  Social Hx: Tobacco: Former Smoker EtOH: none Illicit drug use: none There are no further complaints or symptoms at this time. Past Medical History:  
Diagnosis Date  Abnormal Papanicolaou smear of cervix  Chlamydia   
 during \"my early twenties\"  Genital herpes   
 last outbreak 3-4 weeks ago  Headache  Herpes gestationis  Other unknown and unspecified cause of morbidity or mortality Migraine  Psychiatric problem   
 depression during this pregnancy Past Surgical History:  
Procedure Laterality Date Sam [address] ONLY    
 2004  HX  SECTION Family History:  
Problem Relation Age of Onset  Hypertension Mother  Elevated Lipids Mother  Diabetes Maternal Aunt  Hypertension Maternal Aunt  Diabetes Paternal Aunt  Hypertension Paternal Aunt  Diabetes Paternal Grandfather  Hypertension Father  Elevated Lipids Father Social History Socioeconomic History  Marital status: SINGLE Spouse name: Not on file  Number of children: Not on file  Years of education: Not on file  Highest education level: Not on file Social Needs  Financial resource strain: Not on file  Food insecurity - worry: Not on file  Food insecurity - inability: Not on file  Transportation needs - medical: Not on file  Transportation needs - non-medical: Not on file Occupational History  Not on file Tobacco Use  Smoking status: Former Smoker Last attempt to quit: 2016 Years since quittin.1  Smokeless tobacco: Never Used  Tobacco comment: smoke dfro about 1 month due to stress Substance and Sexual Activity  Alcohol use: No  
  Alcohol/week: 0.6 oz Types: 1 Glasses of wine per week Comment: social drinker when not pregnant  Drug use: No  
 Sexual activity: Not Currently Birth control/protection: None Other Topics Concern  Not on file Social History Narrative  Not on file ALLERGIES: Novocain [procaine] Review of Systems Constitutional: Positive for chills. Negative for fever. Cardiovascular: Negative for chest pain. Gastrointestinal: Positive for abdominal pain, diarrhea, nausea and vomiting. Genitourinary: Negative for dysuria, vaginal bleeding and vaginal discharge. Musculoskeletal: Negative for back pain and neck pain. All other systems reviewed and are negative. There were no vitals filed for this visit. Physical Exam  
Constitutional: She is oriented to person, place, and time. She appears well-developed. No distress. HENT:  
Head: Normocephalic and atraumatic. Eyes: Conjunctivae and EOM are normal.  
Neck: Normal range of motion. Neck supple. Cardiovascular: Normal rate, regular rhythm and normal heart sounds. Pulmonary/Chest: Effort normal and breath sounds normal. No respiratory distress. Abdominal: Soft. There is tenderness (mild) in the epigastric area. There is no guarding. Musculoskeletal: Normal range of motion. She exhibits no edema. Neurological: She is alert and oriented to person, place, and time. She exhibits normal muscle tone. Skin: Skin is warm and dry. Nursing note and vitals reviewed. MDM Procedures ED EKG interpretation: 
Rhythm: normal sinus rhythm; and regular . Rate (approx.): 77; Axis: normal; ST/T wave: normal; This EKG was interpreted by Chapito Cueto DO,ED Provider. 10:49 PM 
 
Pt reports her abdominal pain has improved a lot after Toradol US negative Soft abd This pain has been around for many months Emergent CT was considered today but I told her due to the chronic nature of the complaint and pain reduction with toradol, I did not think it was necessary. Will return if worse. Has PCP and Gyn follow up. Told me at end that she has no insurance and this is why she has not seen someone in the office over the past few months. Labs Reviewed CBC WITH AUTOMATED DIFF - Abnormal; Notable for the following components: Result Value RBC 5.78 (*) MCV 79.6 (*) MCH 24.7 (*) All other components within normal limits METABOLIC PANEL, COMPREHENSIVE - Abnormal; Notable for the following components:  
 Glucose 107 (*) Alk. phosphatase 133 (*) All other components within normal limits URINALYSIS W/MICROSCOPIC - Abnormal; Notable for the following components:  
 Protein TRACE (*) Ketone TRACE (*) Epithelial cells MODERATE (*) Bacteria 1+ (*) Mucus TRACE (*) All other components within normal limits URINE CULTURE HOLD SAMPLE  
HCG QL SERUM  
LIPASE  
SAMPLES BEING HELD  
BILIRUBIN, CONFIRM

## 2018-11-16 NOTE — ED NOTES
Pt given discharge instructions to include lab results, imaging results, and how to care for self at home. Pt advised to to return to the ED if pain worsens

## 2019-02-18 ENCOUNTER — OFFICE VISIT (OUTPATIENT)
Dept: FAMILY MEDICINE CLINIC | Age: 39
End: 2019-02-18

## 2019-02-18 VITALS
WEIGHT: 156 LBS | BODY MASS INDEX: 29.45 KG/M2 | SYSTOLIC BLOOD PRESSURE: 104 MMHG | HEIGHT: 61 IN | DIASTOLIC BLOOD PRESSURE: 64 MMHG | RESPIRATION RATE: 18 BRPM | HEART RATE: 86 BPM | TEMPERATURE: 98 F | OXYGEN SATURATION: 97 %

## 2019-02-18 DIAGNOSIS — R68.89 FLU-LIKE SYMPTOMS: ICD-10-CM

## 2019-02-18 DIAGNOSIS — J02.9 SORE THROAT: Primary | ICD-10-CM

## 2019-02-18 LAB
FLUAV+FLUBV AG NOSE QL IA.RAPID: NEGATIVE POS/NEG
FLUAV+FLUBV AG NOSE QL IA.RAPID: NEGATIVE POS/NEG
S PYO AG THROAT QL: NEGATIVE
VALID INTERNAL CONTROL?: YES
VALID INTERNAL CONTROL?: YES

## 2019-02-18 RX ORDER — AZITHROMYCIN 250 MG/1
TABLET, FILM COATED ORAL
Qty: 6 TAB | Refills: 0 | Status: SHIPPED | OUTPATIENT
Start: 2019-02-18 | End: 2019-04-05

## 2019-02-18 RX ORDER — PROMETHAZINE HYDROCHLORIDE 25 MG/1
25 TABLET ORAL
Qty: 20 TAB | Refills: 0 | Status: SHIPPED | OUTPATIENT
Start: 2019-02-18 | End: 2019-04-05

## 2019-02-18 NOTE — PROGRESS NOTES
Chief Complaint   Patient presents with    Cold Symptoms     Pt states sore throat for 2-3 days, body aches for week, fatigue, stomach pains, and coughing       1. Have you been to the ER, urgent care clinic since your last visit? Hospitalized since your last visit? No    2. Have you seen or consulted any other health care providers outside of the [address] [place] [address] since your last visit? Include any pap smears or colon screening.  No       Visit Vitals  /64 (BP 1 Location: Left arm, BP Patient Position: Sitting)   Pulse 86   Temp 98 °F (36.7 °C) (Oral)   Resp 18   Ht 5' 1\" (1.549 m)   Wt 156 lb (70.8 kg)   SpO2 97%   BMI 29.48 kg/m²

## 2019-02-18 NOTE — PROGRESS NOTES
Chief Complaint   Patient presents with    Cold Symptoms     Pt states sore throat for 2-3 days, body aches for week, fatigue, stomach pains, and coughing     she is a 45y.o. year old female who presents for evalution. Pt states for past 3 days has been having bad sore throat, fatigue, body aches, nausea, coughing. No fever, few chills. Has been using OTC throat drops, Theraflu but not really helping. Course is constant. Reviewed PmHx, RxHx, FmHx, SocHx, AllgHx and updated and dated in the chart. Review of Systems - negative except as listed above in the HPI    Objective:     Vitals:    02/18/19 1520   BP: 104/64   Pulse: 86   Resp: 18   Temp: 98 °F (36.7 °C)   TempSrc: Oral   SpO2: 97%   Weight: 156 lb (70.8 kg)   Height: 5' 1\" (1.549 m)     Physical Examination: General appearance - alert, well appearing, and in no distress  Ears - bilateral TM's and external ear canals normal  Nose - normal nontender sinuses, mucosal congestion and mucosal erythema  Mouth - mucous membranes moist, pharynx normal without lesions and erythematous  Neck - bilateral symmetric anterior adenopathy  Chest - clear to auscultation, no wheezes, rales or rhonchi, symmetric air entry  Heart - normal rate, regular rhythm, normal S1, S2, no murmurs, rubs, clicks or gallops    Assessment/ Plan:   Diagnoses and all orders for this visit:    1. Sore throat  -     AMB POC RAPID STREP A  -     AMB POC RICARDO INFLUENZA A/B TEST  -     azithromycin (ZITHROMAX) 250 mg tablet; Take 2 tabs po today then 1 tab po x 4 days  Likely viral but will cover for bacteria based on exposures. May use OTC throat lozenges for pain relief. Recommend salt water gargles and drinking cold fluids. If given antibiotic, recommend changing toothbrush in 3-5 days. 2. Flu-like symptoms  -     AMB POC RICARDO INFLUENZA A/B TEST  -     promethazine (PHENERGAN) 25 mg tablet; Take 1 Tab by mouth every six (6) hours as needed for Nausea. New rx.   Supportive care.  F/U prn    Pt voiced understanding regarding plan of care. Follow-up Disposition:  Return if symptoms worsen or fail to improve. I have discussed the diagnosis with the patient and the intended plan as seen in the above orders. The patient has received an after-visit summary and questions were answered concerning future plans.      Medication Side Effects and Warnings were discussed with patient    Lesia Orozco NP

## 2019-02-18 NOTE — LETTER
2/18/2019 3:46 PM 
 
Ms. Vicki Del Castillo [facility] [address] Please excuse Ms. Raheem Fisher from work today and tomorrow due to illness. Sincerely, Benjamin Spangler NP

## 2019-02-18 NOTE — PATIENT INSTRUCTIONS

## 2019-04-05 ENCOUNTER — OFFICE VISIT (OUTPATIENT)
Dept: FAMILY MEDICINE CLINIC | Age: 39
End: 2019-04-05

## 2019-04-05 VITALS
SYSTOLIC BLOOD PRESSURE: 106 MMHG | WEIGHT: 158 LBS | BODY MASS INDEX: 29.83 KG/M2 | HEIGHT: 61 IN | HEART RATE: 70 BPM | TEMPERATURE: 98.9 F | DIASTOLIC BLOOD PRESSURE: 73 MMHG | OXYGEN SATURATION: 98 % | RESPIRATION RATE: 18 BRPM

## 2019-04-05 DIAGNOSIS — R10.9 FLANK PAIN: ICD-10-CM

## 2019-04-05 DIAGNOSIS — R35.0 URINARY FREQUENCY: ICD-10-CM

## 2019-04-05 DIAGNOSIS — R10.9 ACUTE LEFT FLANK PAIN: Primary | ICD-10-CM

## 2019-04-05 LAB
BILIRUB UR QL STRIP: NEGATIVE
GLUCOSE UR-MCNC: NEGATIVE MG/DL
KETONES P FAST UR STRIP-MCNC: NEGATIVE MG/DL
PH UR STRIP: 6 [PH] (ref 4.6–8)
PROT UR QL STRIP: NEGATIVE
SP GR UR STRIP: 1.01 (ref 1–1.03)
UA UROBILINOGEN AMB POC: NORMAL (ref 0.2–1)
URINALYSIS CLARITY POC: CLEAR
URINALYSIS COLOR POC: YELLOW
URINE BLOOD POC: NEGATIVE
URINE LEUKOCYTES POC: NEGATIVE
URINE NITRITES POC: NEGATIVE

## 2019-04-05 RX ORDER — CIPROFLOXACIN 500 MG/1
500 TABLET ORAL 2 TIMES DAILY
Qty: 14 TAB | Refills: 0 | Status: SHIPPED | OUTPATIENT
Start: 2019-04-05 | End: 2019-04-12

## 2019-04-05 NOTE — PATIENT INSTRUCTIONS
Flank Pain: Care Instructions  Your Care Instructions  Flank pain is pain on the side of the back just below the rib cage and above the waist. It can be on one or both sides. Flank pain has many possible causes, including a kidney stone, a urinary tract infection, or back strain. Flank pain may get better on its own. But don't ignore new symptoms, such as fever, nausea and vomiting, urination problems, pain that gets worse, and dizziness. These may be signs of a more serious problem. You may have to have tests or other treatment. Follow-up care is a key part of your treatment and safety. Be sure to make and go to all appointments, and call your doctor if you are having problems. It's also a good idea to know your test results and keep a list of the medicines you take. How can you care for yourself at home? · Rest until you feel better. · Take pain medicines exactly as directed. ? If the doctor gave you a prescription medicine for pain, take it as prescribed. ? If you are not taking a prescription pain medicine, ask your doctor if you can take an over-the-counter pain medicine, such as acetaminophen (Tylenol), ibuprofen (Advil, Motrin), or naproxen (Aleve). Read and follow all instructions on the label. · If your doctor prescribed antibiotics, take them as directed. Do not stop taking them just because you feel better. You need to take the full course of antibiotics. · To apply heat, put a warm water bottle, a heating pad set on low, or a warm cloth on the painful area. Do not go to sleep with a heating pad on your skin. · To prevent dehydration, drink plenty of fluids, enough so that your urine is light yellow or clear like water. Choose water and other caffeine-free clear liquids until you feel better. If you have kidney, heart, or liver disease and have to limit fluids, talk with your doctor before you increase the amount of fluids you drink. When should you call for help?   Call your doctor now or seek immediate medical care if:    · Your flank pain gets worse.     · You have new symptoms, such as fever, nausea, or vomiting.     · You have symptoms of a urinary problem. For example:  ? You have blood or pus in your urine. ? You have chills or body aches. ? It hurts to urinate. ? You have groin or belly pain.    Watch closely for changes in your health, and be sure to contact your doctor if you do not get better as expected. Where can you learn more? Go to http://hafsa-ary.info/. Enter S191 in the search box to learn more about \"Flank Pain: Care Instructions. \"  Current as of: September 23, 2018  Content Version: 11.9  © 8803-2254 [employer]. Care instructions adapted under license by [employer] (which disclaims liability or warranty for this information). If you have questions about a medical condition or this instruction, always ask your healthcare professional. [place] [address] any warranty or liability for your use of this information.

## 2019-04-05 NOTE — PROGRESS NOTES
Chief Complaint   Patient presents with    Flank Pain     left side     Patient in office today for sx that began one wk ago. Pt have noted slight pressure and urinary frequency. Pt denies recent injury. Have treated with ibuprofen with no relief noted. Results for orders placed or performed in visit on 04/05/19   AMB POC URINALYSIS DIP STICK AUTO W/O MICRO   Result Value Ref Range    Color (UA POC) Yellow     Clarity (UA POC) Clear     Glucose (UA POC) Negative Negative    Bilirubin (UA POC) Negative Negative    Ketones (UA POC) Negative Negative    Specific gravity (UA POC) 1.015 1.001 - 1.035    Blood (UA POC) Negative Negative    pH (UA POC) 6.0 4.6 - 8.0    Protein (UA POC) Negative Negative    Urobilinogen (UA POC) 1 mg/dL 0.2 - 1    Nitrites (UA POC) Negative Negative    Leukocyte esterase (UA POC) Negative Negative     Pt reports being prone to UTI's. Pain is making it hard to walk due to severity of pain. Has to hold herself steady. LMP was on 3/26 and lighter than usual.   Denies any chance she could be pregnant. Only recently sexually active this week. Pain comes and goes for about a week now. Denies any trauma or injury before it started. Denies any history of kidney stones. Denies any blood in her urine. Denies any pain with urination, only a pressure. Bladder feels cano. Denies pain being worse with certain movements. Feels like a dull achy pain. Denies any constipation. Had some diarrhea today but thinks that is diet related. Denies any v. Has had some nausea. Has noticed some increased dry skin on her face. Denies any rash on her side. Denies any fever or chills. Denies any cough. Has been trying to push fluids this week which has not really helped. Denies any other concerns at this time. Chief Complaint   Patient presents with    Flank Pain     left side     she is a 45y.o. year old female who presents for evalution.     Reviewed PmHx, RxHx, FmHx, SocHx, AllgHx and updated and dated in the chart. Review of Systems - negative except as listed above in the HPI    Objective:     Vitals:    04/05/19 1603   BP: 106/73   Pulse: 70   Resp: 18   Temp: 98.9 °F (37.2 °C)   TempSrc: Oral   SpO2: 98%   Weight: 158 lb (71.7 kg)   Height: 5' 1\" (1.549 m)     Physical Examination: General appearance - alert, well appearing, and in no distress  Eyes - pupils equal and reactive, extraocular eye movements intact  Ears - bilateral TM's and external ear canals normal  Nose - normal and patent, no erythema, discharge or polyps and normal nontender sinuses  Mouth - mucous membranes moist, pharynx normal without lesions  Neck - supple, no significant adenopathy  Chest - clear to auscultation, no wheezes, rales or rhonchi, symmetric air entry  Heart - normal rate, regular rhythm, normal S1, S2, no murmurs  Abdomen - soft, some tenderness with deep palpation of the LUQ but reports that pain is more in the posterior/flank region, nondistended, no masses or organomegaly  bowel sounds normal  Reports pain in the left flank region that is not reproducible with torso ROM, no overlying skin changes or palpable abnormality  Musculoskeletal - no joint tenderness, deformity or swelling    Assessment/ Plan:   Diagnoses and all orders for this visit:    1. Acute left flank pain  -     METABOLIC PANEL, COMPREHENSIVE  -     CBC WITH AUTOMATED DIFF  -     HCG QL SERUM  -     US RETROPERITONEUM COMP; Future  -     XR ABD (KUB); Future  -     ciprofloxacin HCl (CIPRO) 500 mg tablet; Take 1 Tab by mouth two (2) times a day for 7 days. Recommended cipro as directed. Reviewed SEs/ADRs of medication. Complete imaging for further evaluation. Will notify results and deviate plan based on findings. Push fluids. Reviewed acute s/sx that warrant more immediate medical attention and pt verbalized understanding of this. 2. Flank pain / 3.  Urinary frequency  -     AMB POC URINALYSIS DIP STICK AUTO W/O MICRO  UA looks normal.      Follow-up and Dispositions    · Return if symptoms worsen or fail to improve. I have discussed the diagnosis with the patient and the intended plan as seen in the above orders. The patient has received an after-visit summary and questions were answered concerning future plans. Medication Side Effects and Warnings were discussed with patient: yes  Patient Labs were reviewed and or requested: yes  Patient Past Records were reviewed and or requested  yes  Patient / Caregiver Understanding of treatment plan was verbalized during office visit YES    AMALIA Arnett    Patient Instructions          Flank Pain: Care Instructions  Your Care Instructions  Flank pain is pain on the side of the back just below the rib cage and above the waist. It can be on one or both sides. Flank pain has many possible causes, including a kidney stone, a urinary tract infection, or back strain. Flank pain may get better on its own. But don't ignore new symptoms, such as fever, nausea and vomiting, urination problems, pain that gets worse, and dizziness. These may be signs of a more serious problem. You may have to have tests or other treatment. Follow-up care is a key part of your treatment and safety. Be sure to make and go to all appointments, and call your doctor if you are having problems. It's also a good idea to know your test results and keep a list of the medicines you take. How can you care for yourself at home? · Rest until you feel better. · Take pain medicines exactly as directed. ? If the doctor gave you a prescription medicine for pain, take it as prescribed. ? If you are not taking a prescription pain medicine, ask your doctor if you can take an over-the-counter pain medicine, such as acetaminophen (Tylenol), ibuprofen (Advil, Motrin), or naproxen (Aleve). Read and follow all instructions on the label. · If your doctor prescribed antibiotics, take them as directed.  Do not stop taking them just because you feel better. You need to take the full course of antibiotics. · To apply heat, put a warm water bottle, a heating pad set on low, or a warm cloth on the painful area. Do not go to sleep with a heating pad on your skin. · To prevent dehydration, drink plenty of fluids, enough so that your urine is light yellow or clear like water. Choose water and other caffeine-free clear liquids until you feel better. If you have kidney, heart, or liver disease and have to limit fluids, talk with your doctor before you increase the amount of fluids you drink. When should you call for help? Call your doctor now or seek immediate medical care if:    · Your flank pain gets worse.     · You have new symptoms, such as fever, nausea, or vomiting.     · You have symptoms of a urinary problem. For example:  ? You have blood or pus in your urine. ? You have chills or body aches. ? It hurts to urinate. ? You have groin or belly pain.    Watch closely for changes in your health, and be sure to contact your doctor if you do not get better as expected. Where can you learn more? Go to http://hafsa-ary.info/. Enter S191 in the search box to learn more about \"Flank Pain: Care Instructions. \"  Current as of: September 23, 2018  Content Version: 11.9  © 1606-2752 [employer]. Care instructions adapted under license by [employer] (which disclaims liability or warranty for this information). If you have questions about a medical condition or this instruction, always ask your healthcare professional. [place] [address] any warranty or liability for your use of this information.

## 2019-04-05 NOTE — PROGRESS NOTES
Chief Complaint   Patient presents with    Flank Pain     left side     Patient in office today for sx that began one wk ago. Pt have noted slight pressure and urinary frequency. Pt denies recent injury. Have treated with ibuprofen with no relief noted.

## 2019-04-06 LAB
ALBUMIN SERPL-MCNC: 4.3 G/DL (ref 3.5–5.5)
ALBUMIN/GLOB SERPL: 1.4 {RATIO} (ref 1.2–2.2)
ALP SERPL-CCNC: 107 IU/L (ref 39–117)
ALT SERPL-CCNC: 11 IU/L (ref 0–32)
AST SERPL-CCNC: 18 IU/L (ref 0–40)
B-HCG SERPL QL: NEGATIVE MIU/ML
BASOPHILS # BLD AUTO: 0 X10E3/UL (ref 0–0.2)
BASOPHILS NFR BLD AUTO: 0 %
BILIRUB SERPL-MCNC: <0.2 MG/DL (ref 0–1.2)
BUN SERPL-MCNC: 8 MG/DL (ref 6–20)
BUN/CREAT SERPL: 15 (ref 9–23)
CALCIUM SERPL-MCNC: 9.8 MG/DL (ref 8.7–10.2)
CHLORIDE SERPL-SCNC: 103 MMOL/L (ref 96–106)
CO2 SERPL-SCNC: 25 MMOL/L (ref 20–29)
CREAT SERPL-MCNC: 0.54 MG/DL (ref 0.57–1)
EOSINOPHIL # BLD AUTO: 0.3 X10E3/UL (ref 0–0.4)
EOSINOPHIL NFR BLD AUTO: 4 %
ERYTHROCYTE [DISTWIDTH] IN BLOOD BY AUTOMATED COUNT: 14.6 % (ref 12.3–15.4)
GLOBULIN SER CALC-MCNC: 3 G/DL (ref 1.5–4.5)
GLUCOSE SERPL-MCNC: 94 MG/DL (ref 65–99)
HCT VFR BLD AUTO: 41.7 % (ref 34–46.6)
HGB BLD-MCNC: 13.2 G/DL (ref 11.1–15.9)
IMM GRANULOCYTES # BLD AUTO: 0 X10E3/UL (ref 0–0.1)
IMM GRANULOCYTES NFR BLD AUTO: 0 %
LYMPHOCYTES # BLD AUTO: 2.8 X10E3/UL (ref 0.7–3.1)
LYMPHOCYTES NFR BLD AUTO: 36 %
MCH RBC QN AUTO: 25.4 PG (ref 26.6–33)
MCHC RBC AUTO-ENTMCNC: 31.7 G/DL (ref 31.5–35.7)
MCV RBC AUTO: 80 FL (ref 79–97)
MONOCYTES # BLD AUTO: 0.2 X10E3/UL (ref 0.1–0.9)
MONOCYTES NFR BLD AUTO: 3 %
NEUTROPHILS # BLD AUTO: 4.3 X10E3/UL (ref 1.4–7)
NEUTROPHILS NFR BLD AUTO: 57 %
PLATELET # BLD AUTO: 363 X10E3/UL (ref 150–379)
POTASSIUM SERPL-SCNC: 4.4 MMOL/L (ref 3.5–5.2)
PROT SERPL-MCNC: 7.3 G/DL (ref 6–8.5)
RBC # BLD AUTO: 5.19 X10E6/UL (ref 3.77–5.28)
SODIUM SERPL-SCNC: 141 MMOL/L (ref 134–144)
WBC # BLD AUTO: 7.6 X10E3/UL (ref 3.4–10.8)

## 2019-04-07 NOTE — PROGRESS NOTES
Please notify pt the followin. Labs confirm she is not pregnant. 2. Normal glucose, kidney and liver function. 3. No evidence of infection or anemia in labs done. Continue with plan discussed during OV. Will notify pt when I receive and review results of her imaging.

## 2019-07-15 ENCOUNTER — OFFICE VISIT (OUTPATIENT)
Dept: FAMILY MEDICINE CLINIC | Age: 39
End: 2019-07-15

## 2019-07-15 VITALS
BODY MASS INDEX: 28.89 KG/M2 | RESPIRATION RATE: 12 BRPM | SYSTOLIC BLOOD PRESSURE: 108 MMHG | HEIGHT: 61 IN | OXYGEN SATURATION: 98 % | DIASTOLIC BLOOD PRESSURE: 68 MMHG | WEIGHT: 153 LBS | TEMPERATURE: 97.7 F | HEART RATE: 90 BPM

## 2019-07-15 DIAGNOSIS — R11.10 VOMITING, INTRACTABILITY OF VOMITING NOT SPECIFIED, PRESENCE OF NAUSEA NOT SPECIFIED, UNSPECIFIED VOMITING TYPE: Primary | ICD-10-CM

## 2019-07-15 DIAGNOSIS — R31.9 HEMATURIA, UNSPECIFIED TYPE: ICD-10-CM

## 2019-07-15 LAB
BILIRUB UR QL STRIP: NEGATIVE
GLUCOSE UR-MCNC: NEGATIVE MG/DL
KETONES P FAST UR STRIP-MCNC: NEGATIVE MG/DL
PH UR STRIP: 7 [PH] (ref 4.6–8)
PROT UR QL STRIP: NORMAL
SP GR UR STRIP: 1.02 (ref 1–1.03)
UA UROBILINOGEN AMB POC: NORMAL (ref 0.2–1)
URINALYSIS CLARITY POC: NORMAL
URINALYSIS COLOR POC: YELLOW
URINE BLOOD POC: NEGATIVE
URINE LEUKOCYTES POC: NEGATIVE
URINE NITRITES POC: NEGATIVE

## 2019-07-15 RX ORDER — ONDANSETRON 8 MG/1
8 TABLET, ORALLY DISINTEGRATING ORAL
Qty: 30 TAB | Refills: 0 | Status: SHIPPED | OUTPATIENT
Start: 2019-07-15

## 2019-07-15 RX ORDER — PANTOPRAZOLE SODIUM 40 MG/1
40 TABLET, DELAYED RELEASE ORAL DAILY
Qty: 30 TAB | Refills: 2 | Status: SHIPPED | OUTPATIENT
Start: 2019-07-15

## 2019-07-15 NOTE — PROGRESS NOTES
Omid Swenson is a 45 y.o. female , id x 2(name and ). Reviewed record, history, and  medications. Chief Complaint   Patient presents with    Vomiting     pt states she has been feeling bad for about a month. states she cannot keep anything down.  Headache     pt states she ahs treated with tylenol     Hives     itching stomach, arms, inner thighs    Blood in Urine     pt states x3days she seen blood in her urine        Vitals:    07/15/19 1010   BP: 108/68   Pulse: 90   Resp: 12   Temp: 97.7 °F (36.5 °C)   SpO2: 98%   Weight: 153 lb (69.4 kg)   Height: 5' 1\" (1.549 m)   PainSc:   5   PainLoc: Generalized   LMP: 2019       Coordination of Care Questionnaire:   1) Have you been to an emergency room, urgent care, or hospitalized since your last visit?   no       2. Have seen or consulted any other health care provider since your last visit?  NO    3 most recent PHQ Screens 7/15/2019   Little interest or pleasure in doing things Nearly every day   Feeling down, depressed, irritable, or hopeless Nearly every day   Total Score PHQ 2 6   Trouble falling or staying asleep, or sleeping too much Nearly every day   Feeling tired or having little energy Nearly every day   Poor appetite, weight loss, or overeating Not at all   Feeling bad about yourself - or that you are a failure or have let yourself or your family down Not at all   Trouble concentrating on things such as school, work, reading, or watching TV Not at all   Moving or speaking so slowly that other people could have noticed; or the opposite being so fidgety that others notice Several days   Thoughts of being better off dead, or hurting yourself in some way Not at all   PHQ 9 Score 13   How difficult have these problems made it for you to do your work, take care of your home and get along with others Somewhat difficult       Patient is accompanied by daughters I have received verbal consent from Omid Swenson to discuss any/all medical information while they are present in the room. Chief Complaint   Patient presents with    Vomiting     pt states she has been feeling bad for about a month. states she cannot keep anything down.  Headache     pt states she ahs treated with tylenol     Hives     itching stomach, arms, inner thighs    Blood in Urine     pt states x3days she seen blood in her urine      She is a 45 y.o. female who presents for evalution. Reviewed PmHx, RxHx, FmHx, SocHx, AllgHx and updated and dated in the chart. Patient Active Problem List    Diagnosis    Normal pregnancy in multigravida       Review of Systems - negative except as listed above in the HPI    Objective:     Vitals:    07/15/19 1010   BP: 108/68   Pulse: 90   Resp: 12   Temp: 97.7 °F (36.5 °C)   SpO2: 98%   Weight: 153 lb (69.4 kg)   Height: 5' 1\" (1.549 m)     Physical Examination: General appearance - alert, well appearing, and in no distress  Mouth - mucous membranes moist, pharynx normal without lesions  Chest - clear to auscultation, no wheezes, rales or rhonchi, symmetric air entry  Heart - normal rate, regular rhythm, normal S1, S2, no murmurs, rubs, clicks or gallops  Abdomen - soft, nontender, nondistended, no masses or organomegaly      Assessment/ Plan:   Diagnoses and all orders for this visit:    1. Vomiting, intractability of vomiting not specified, presence of nausea not specified, unspecified vomiting type  -     US ABD COMP; Future  -     ondansetron (ZOFRAN ODT) 8 mg disintegrating tablet; Take 1 Tab by mouth every eight (8) hours as needed for Nausea. -     pantoprazole (PROTONIX) 40 mg tablet; Take 1 Tab by mouth daily. -GB vs PUD vs other  -dc nsaids  -pt never got prior xrays    2. Hematuria, unspecified type  -     AMB POC URINALYSIS DIP STICK MANUAL W/ MICRO  -neg           I have discussed the diagnosis with the patient and the intended plan as seen in the above orders. The patient understands and agrees with the plan.  The patient has received an after-visit summary and questions were answered concerning future plans. Medication Side Effects and Warnings were discussed with patient  Patient Labs were reviewed and or requested:  Patient Past Records were reviewed and or requested    Asad Mc M.D. There are no Patient Instructions on file for this visit.

## 2019-08-05 ENCOUNTER — HOSPITAL ENCOUNTER (OUTPATIENT)
Dept: ULTRASOUND IMAGING | Age: 39
Discharge: HOME | End: 2019-08-05
Attending: FAMILY MEDICINE
Payer: MEDICAID

## 2019-08-05 DIAGNOSIS — R11.10 VOMITING, INTRACTABILITY OF VOMITING NOT SPECIFIED, PRESENCE OF NAUSEA NOT SPECIFIED, UNSPECIFIED VOMITING TYPE: ICD-10-CM

## 2019-08-05 DIAGNOSIS — R10.9 ACUTE LEFT FLANK PAIN: ICD-10-CM

## 2019-08-05 PROCEDURE — 76700 US EXAM ABDOM COMPLETE: CPT

## 2019-08-27 ENCOUNTER — DOCUMENTATION ONLY (OUTPATIENT)
Dept: FAMILY MEDICINE CLINIC | Age: 39
End: 2019-08-27

## 2019-08-27 NOTE — PROGRESS NOTES
Faxed 07/15/19 office note & 04/05/19 office note/lab results to Dr Jacob Mason per [facility] request. Fax #[phone] confirmation received.

## 2020-04-30 ENCOUNTER — VIRTUAL VISIT (OUTPATIENT)
Dept: FAMILY MEDICINE CLINIC | Age: 40
End: 2020-04-30

## 2020-04-30 DIAGNOSIS — N64.4 BREAST PAIN, LEFT: Primary | ICD-10-CM

## 2020-04-30 RX ORDER — IBUPROFEN 800 MG/1
TABLET ORAL
Qty: 60 TAB | Refills: 0 | Status: SHIPPED | OUTPATIENT
Start: 2020-04-30 | End: 2020-05-28 | Stop reason: SDUPTHER
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
## 2020-05-28 DIAGNOSIS — N64.4 BREAST PAIN, LEFT: ICD-10-CM

## 2020-05-28 RX ORDER — IBUPROFEN 800 MG/1
TABLET ORAL
Qty: 60 TAB | Refills: 0 | Status: SHIPPED | OUTPATIENT
Start: 2020-05-28

## 2020-06-05 ENCOUNTER — HOSPITAL ENCOUNTER (OUTPATIENT)
Dept: MAMMOGRAPHY | Age: 40
Discharge: HOME | End: 2020-06-05
Attending: NURSE PRACTITIONER
Payer: MEDICAID

## 2020-06-05 DIAGNOSIS — N64.4 BREAST PAIN, LEFT: ICD-10-CM

## 2020-06-05 DIAGNOSIS — Z00.00 ANNUAL PHYSICAL EXAM: ICD-10-CM

## 2020-06-05 PROCEDURE — 77067 SCR MAMMO BI INCL CAD: CPT

## 2020-06-05 NOTE — PROGRESS NOTES
Results released via Outsell as follows:    Hi Ms Stratton Organ,     Your mammogram was negative. I recommend we re-screen with mammogram yearly.      Laureen Caceres NP

## 2020-08-08 LAB — SARS-COV-2, NAA: NOT DETECTED

## 2021-03-25 ENCOUNTER — TRANSCRIBE ORDER (OUTPATIENT)
Dept: REGISTRATION | Age: 41
End: 2021-03-25

## 2021-03-25 ENCOUNTER — HOSPITAL ENCOUNTER (OUTPATIENT)
Dept: PREADMISSION TESTING | Age: 41
Discharge: HOME | End: 2021-03-25
Payer: MEDICAID

## 2021-03-25 DIAGNOSIS — Z01.812 PRE-PROCEDURE LAB EXAM: ICD-10-CM

## 2021-03-25 DIAGNOSIS — Z01.812 PRE-PROCEDURE LAB EXAM: Primary | ICD-10-CM

## 2021-03-25 PROCEDURE — U0003 INFECTIOUS AGENT DETECTION BY NUCLEIC ACID (DNA OR RNA); SEVERE ACUTE RESPIRATORY SYNDROME CORONAVIRUS 2 (SARS-COV-2) (CORONAVIRUS DISEASE [COVID-19]), AMPLIFIED PROBE TECHNIQUE, MAKING USE OF HIGH THROUGHPUT TECHNOLOGIES AS DESCRIBED BY CMS-2020-01-R: HCPCS

## 2021-03-26 LAB — SARS-COV-2, COV2NT: NOT DETECTED

## 2021-03-29 ENCOUNTER — ANESTHESIA EVENT (OUTPATIENT)
Dept: ENDOSCOPY | Age: 41
End: 2021-03-29
Payer: MEDICAID

## 2021-03-29 ENCOUNTER — HOSPITAL ENCOUNTER (OUTPATIENT)
Age: 41
Setting detail: OUTPATIENT SURGERY
Discharge: HOME | End: 2021-03-29
Attending: INTERNAL MEDICINE | Admitting: INTERNAL MEDICINE
Payer: MEDICAID

## 2021-03-29 ENCOUNTER — ANESTHESIA (OUTPATIENT)
Dept: ENDOSCOPY | Age: 41
End: 2021-03-29
Payer: MEDICAID

## 2021-03-29 VITALS
OXYGEN SATURATION: 97 % | TEMPERATURE: 97.5 F | SYSTOLIC BLOOD PRESSURE: 114 MMHG | BODY MASS INDEX: 31.38 KG/M2 | HEART RATE: 61 BPM | DIASTOLIC BLOOD PRESSURE: 64 MMHG | WEIGHT: 166.2 LBS | HEIGHT: 61 IN | RESPIRATION RATE: 13 BRPM

## 2021-03-29 LAB
H PYLORI FROM TISSUE: POSITIVE
HCG UR QL: NEGATIVE
KIT LOT NO., HCLOLOT: ABNORMAL
NEGATIVE CONTROL: NEGATIVE
POSITIVE CONTROL: POSITIVE

## 2021-03-29 PROCEDURE — 88305 TISSUE EXAM BY PATHOLOGIST: CPT

## 2021-03-29 PROCEDURE — 74011000250 HC RX REV CODE- 250: Performed by: NURSE ANESTHETIST, CERTIFIED REGISTERED

## 2021-03-29 PROCEDURE — 76060000032 HC ANESTHESIA 0.5 TO 1 HR: Performed by: INTERNAL MEDICINE

## 2021-03-29 PROCEDURE — 74011250637 HC RX REV CODE- 250/637: Performed by: INTERNAL MEDICINE

## 2021-03-29 PROCEDURE — 74011250636 HC RX REV CODE- 250/636: Performed by: NURSE ANESTHETIST, CERTIFIED REGISTERED

## 2021-03-29 PROCEDURE — 2709999900 HC NON-CHARGEABLE SUPPLY: Performed by: INTERNAL MEDICINE

## 2021-03-29 PROCEDURE — 81025 URINE PREGNANCY TEST: CPT

## 2021-03-29 PROCEDURE — 76040000007: Performed by: INTERNAL MEDICINE

## 2021-03-29 PROCEDURE — 77030021593 HC FCPS BIOP ENDOSC BSC -A: Performed by: INTERNAL MEDICINE

## 2021-03-29 PROCEDURE — 87077 CULTURE AEROBIC IDENTIFY: CPT | Performed by: INTERNAL MEDICINE

## 2021-03-29 RX ORDER — PROPOFOL 10 MG/ML
INJECTION, EMULSION INTRAVENOUS AS NEEDED
Status: DISCONTINUED | OUTPATIENT
Start: 2021-03-29 | End: 2021-03-29 | Stop reason: HOSPADM

## 2021-03-29 RX ORDER — SODIUM CHLORIDE 0.9 % (FLUSH) 0.9 %
5-40 SYRINGE (ML) INJECTION EVERY 8 HOURS
Status: DISCONTINUED | OUTPATIENT
Start: 2021-03-29 | End: 2021-03-29 | Stop reason: HOSPADM

## 2021-03-29 RX ORDER — LIDOCAINE HYDROCHLORIDE 20 MG/ML
INJECTION, SOLUTION EPIDURAL; INFILTRATION; INTRACAUDAL; PERINEURAL AS NEEDED
Status: DISCONTINUED | OUTPATIENT
Start: 2021-03-29 | End: 2021-03-29 | Stop reason: HOSPADM

## 2021-03-29 RX ORDER — ACETAMINOPHEN 500 MG
500 TABLET ORAL
Status: COMPLETED | OUTPATIENT
Start: 2021-03-29 | End: 2021-03-29

## 2021-03-29 RX ORDER — ATROPINE SULFATE 0.1 MG/ML
0.5 INJECTION INTRAVENOUS
Status: DISCONTINUED | OUTPATIENT
Start: 2021-03-29 | End: 2021-03-29 | Stop reason: HOSPADM

## 2021-03-29 RX ORDER — FLUMAZENIL 0.1 MG/ML
0.2 INJECTION INTRAVENOUS
Status: DISCONTINUED | OUTPATIENT
Start: 2021-03-29 | End: 2021-03-29 | Stop reason: HOSPADM

## 2021-03-29 RX ORDER — SODIUM CHLORIDE 0.9 % (FLUSH) 0.9 %
5-40 SYRINGE (ML) INJECTION AS NEEDED
Status: DISCONTINUED | OUTPATIENT
Start: 2021-03-29 | End: 2021-03-29 | Stop reason: HOSPADM

## 2021-03-29 RX ORDER — BISMUTH SUBSALICYLATE 262 MG
1 TABLET,CHEWABLE ORAL DAILY
COMMUNITY

## 2021-03-29 RX ORDER — SODIUM CHLORIDE 9 MG/ML
INJECTION, SOLUTION INTRAVENOUS
Status: DISCONTINUED | OUTPATIENT
Start: 2021-03-29 | End: 2021-03-29 | Stop reason: HOSPADM

## 2021-03-29 RX ORDER — ACETAMINOPHEN 325 MG/1
325 TABLET ORAL
COMMUNITY

## 2021-03-29 RX ORDER — DEXTROMETHORPHAN/PSEUDOEPHED 2.5-7.5/.8
1.2 DROPS ORAL
Status: DISCONTINUED | OUTPATIENT
Start: 2021-03-29 | End: 2021-03-29 | Stop reason: HOSPADM

## 2021-03-29 RX ORDER — NALOXONE HYDROCHLORIDE 0.4 MG/ML
0.4 INJECTION, SOLUTION INTRAMUSCULAR; INTRAVENOUS; SUBCUTANEOUS
Status: DISCONTINUED | OUTPATIENT
Start: 2021-03-29 | End: 2021-03-29 | Stop reason: HOSPADM

## 2021-03-29 RX ORDER — EPINEPHRINE 0.1 MG/ML
1 INJECTION INTRACARDIAC; INTRAVENOUS
Status: DISCONTINUED | OUTPATIENT
Start: 2021-03-29 | End: 2021-03-29 | Stop reason: HOSPADM

## 2021-03-29 RX ADMIN — PROPOFOL 25 MG: 10 INJECTION, EMULSION INTRAVENOUS at 10:26

## 2021-03-29 RX ADMIN — PROPOFOL 25 MG: 10 INJECTION, EMULSION INTRAVENOUS at 10:10

## 2021-03-29 RX ADMIN — PROPOFOL 75 MG: 10 INJECTION, EMULSION INTRAVENOUS at 10:03

## 2021-03-29 RX ADMIN — PROPOFOL 25 MG: 10 INJECTION, EMULSION INTRAVENOUS at 10:39

## 2021-03-29 RX ADMIN — PROPOFOL 25 MG: 10 INJECTION, EMULSION INTRAVENOUS at 10:17

## 2021-03-29 RX ADMIN — PROPOFOL 25 MG: 10 INJECTION, EMULSION INTRAVENOUS at 10:24

## 2021-03-29 RX ADMIN — PROPOFOL 25 MG: 10 INJECTION, EMULSION INTRAVENOUS at 10:04

## 2021-03-29 RX ADMIN — PROPOFOL 25 MG: 10 INJECTION, EMULSION INTRAVENOUS at 10:20

## 2021-03-29 RX ADMIN — PROPOFOL 25 MG: 10 INJECTION, EMULSION INTRAVENOUS at 10:18

## 2021-03-29 RX ADMIN — LIDOCAINE HYDROCHLORIDE 100 MG: 20 INJECTION, SOLUTION EPIDURAL; INFILTRATION; INTRACAUDAL; PERINEURAL at 10:03

## 2021-03-29 RX ADMIN — PROPOFOL 25 MG: 10 INJECTION, EMULSION INTRAVENOUS at 10:06

## 2021-03-29 RX ADMIN — PROPOFOL 25 MG: 10 INJECTION, EMULSION INTRAVENOUS at 10:08

## 2021-03-29 RX ADMIN — PROPOFOL 25 MG: 10 INJECTION, EMULSION INTRAVENOUS at 10:36

## 2021-03-29 RX ADMIN — SODIUM CHLORIDE: 900 INJECTION, SOLUTION INTRAVENOUS at 09:49

## 2021-03-29 RX ADMIN — PROPOFOL 25 MG: 10 INJECTION, EMULSION INTRAVENOUS at 10:15

## 2021-03-29 RX ADMIN — PROPOFOL 25 MG: 10 INJECTION, EMULSION INTRAVENOUS at 10:22

## 2021-03-29 RX ADMIN — PROPOFOL 25 MG: 10 INJECTION, EMULSION INTRAVENOUS at 10:12

## 2021-03-29 RX ADMIN — PROPOFOL 25 MG: 10 INJECTION, EMULSION INTRAVENOUS at 10:34

## 2021-03-29 RX ADMIN — PROPOFOL 25 MG: 10 INJECTION, EMULSION INTRAVENOUS at 10:30

## 2021-03-29 RX ADMIN — ACETAMINOPHEN 500 MG: 500 TABLET ORAL at 11:13

## 2021-03-29 RX ADMIN — PROPOFOL 50 MG: 10 INJECTION, EMULSION INTRAVENOUS at 10:14

## 2021-03-29 NOTE — PROCEDURES
[facility].  [address] [facility] [address], [address]  [phone]                           Colonoscopy and EGD Procedure Note      Indications:    Abdominal pain, generalized, Change in bowel habits, nausea     :  Ashok Pedro MD    Referring Provider: Irma Salinas NP    Sedation:  MAC anesthesia Propofol    Procedure Details:  After informed consent was obtained with all risks and benefits of procedure explained and preoperative exam completed, the patient was taken to the endoscopy suite and placed in the left lateral decubitus position. Upon sequential administration of sedation as per above, the [id number] gastroscope was inserted into the mouth and advanced under direct vision to second portion of the duodenum. A careful inspection was made as the gastroscope was withdrawn, including a retroflexed view of the proximal stomach; findings and interventions are described below. EGD Findings:  Esophagus:grade  1 reflux esophagitis and irregular GE junction seen, biopsies taken  Stomach:mild erythema was noted in  antrum, biopsies taken for DIAZ. Duodenum/jejunum:normal, random bxs taken    Following sequential sedation as per above, a digital rectal exam was performed  And was normal.  The Olympus videocolonoscope  was inserted in the rectum and carefully advanced to the cecum, which was identified by the ileocecal valve and appendiceal orifice, terminal ileum. The quality of preparation was fair. The colonoscope was slowly withdrawn with careful evaluation between folds. Retroflexion in the rectum was performed and was normal..     Colon Findings:   Rectum: 2mm polyp removed by cold biopsy.  Grade II internal hemorrhoids present  Sigmoid: normal, random bxs taken  Descending Colon: normal, random bxs taken  Transverse Colon: normal, random bxs taken  Ascending Colon: normal, random bxs taken  Cecum: normal, random bxs taken  Terminal Ileum: normal    Therapies: none    Complications:   None; patient tolerated the procedure well. Impression:    1. - Esophagitis  2. - Gastritis  3. - Rectal Polyp  4. - Internal Hemorrhoids      Recommendations:  -Start pantoprazole 20mg daily  -Await pathology. ,   -Await DIAZ test result and treat for Helicobacter pylori if positive. ,   -GERD diet: avoid fried and fatty foods. peppermint, chocolate, alcohol, coffee, citrus fruits and juices, tomoato products; avoid lying down for 2 to 3 hours after eating.  -Resume normal medication(s). Interventions:  none    Complications: None. Specimen Removed:    ID Type Source Tests Collected by Time Destination   1 : Duodenum Biopsies Preservative Duodenum  Majo Mata MD 3/29/2021 1010 Pathology   2 : GE Junction Biopsies Preservative GE Junction  Majo Mata MD 3/29/2021 1010 Pathology   3 : Random Colon Biopsies Preservative Colon  Majo Mata MD 3/29/2021 1026 Pathology   4 : Rectal Polyp Preservative Rectal  Majo Mata MD 3/29/2021 1041 Pathology       EBL:  None. Discharge Disposition:  Home in the company of a  when able to ambulate.     Juan Spann MD  3/29/2021  10:46 AM

## 2021-03-29 NOTE — ANESTHESIA PREPROCEDURE EVALUATION
Relevant Problems   No relevant active problems       Anesthetic History   No history of anesthetic complications            Review of Systems / Medical History  Patient summary reviewed, nursing notes reviewed and pertinent labs reviewed    Pulmonary  Within defined limits                 Neuro/Psych   Within defined limits           Cardiovascular                  Exercise tolerance: >4 METS     GI/Hepatic/Renal                Endo/Other             Other Findings              Physical Exam    Airway  Mallampati: I  TM Distance: > 6 cm  Neck ROM: normal range of motion   Mouth opening: Normal     Cardiovascular    Rhythm: regular  Rate: normal         Dental  No notable dental hx       Pulmonary  Breath sounds clear to auscultation               Abdominal         Other Findings            Anesthetic Plan    ASA: 1  Anesthesia type: MAC          Induction: Intravenous  Anesthetic plan and risks discussed with: Patient

## 2021-03-29 NOTE — DISCHARGE INSTRUCTIONS
[facility].  [address] [place] [address]  [phone]  1980    DISCOMFORT:  Redness at IV site- apply warm compress to area; if redness or soreness persist- contact your physician  There may be a slight amount of blood passed from the rectum  Gaseous discomfort- walking, belching will help relieve any discomfort    DIET:   GERD diet: avoid fried and fatty foods. peppermint, chocolate, alcohol, coffee, citrus fruits and juices, tomoato products; avoid lying down for 2 to 3 hours after eating.   - however -  remember your colon is empty and a heavy meal will produce gas. Avoid these foods:  vegetables, fried / greasy foods, carbonated drinks for today   You may not  drink alcoholic beverages for at least 12 hours      ACTIVITY  Spend the remainder of the day resting -  avoid any strenuous activity, then you may resume your normal daily activities   You may not operate a vehicle for 12 hours  You may not  engage in an occupation involving machinery or appliances for rest of today  Avoid making any critical decisions for at least 24 hour    CALL M.D. ANY SIGN OF   Increasing pain, nausea, vomiting  Abdominal distension (swelling)  New increased bleeding (oral or rectal)  Fever (chills)  Pain in chest area  Bloody discharge from nose or mouth  Shortness of breath    You may not  take any Advil, Aspirin, Ibuprofen, Motrin, Aleve, or Goodys for 10 days, ONLY  Tylenol as needed for pain. Post procedure diagnosis: 1. - Esophagitis  2. - Gastritis  3. - Rectal Polyp  4. - Internal Hemorrhoids        Follow-up Instructions:   Call Dr. Aretha Madrigal for any questions or problems. If we took a biopsy please call the office within 2 weeks to discuss your pathology results.  Telephone # [phone]         Learning About Coronavirus [phone])  Coronavirus [phone]): Overview  What is coronavirus (COVID-19)? The coronavirus disease (COVID-19) is caused by a virus. It is an illness that was first found in [place], [place], in December 2019. It has since spread worldwide. The virus can cause fever, cough, and trouble breathing. In severe cases, it can cause pneumonia and make it hard to breathe without help. It can cause death. Coronaviruses are a large group of viruses. They cause the common cold. They also cause more serious illnesses like Middle East respiratory syndrome (MERS) and severe acute respiratory syndrome (SARS). COVID-19 is caused by a novel coronavirus. That means it's a new type that has not been seen in people before. This virus spreads person-to-person through droplets from coughing and sneezing. It can also spread when you are close to someone who is infected. And it can spread when you touch something that has the virus on it, such as a doorknob or a tabletop. What can you do to protect yourself from coronavirus (COVID-19)? The best way to protect yourself from getting sick is to:  · Avoid areas where there is an outbreak. · Avoid contact with people who may be infected. · Wash your hands often with soap or alcohol-based hand sanitizers. · Avoid crowds and try to stay at least 6 feet away from other people. · Wash your hands often, especially after you cough or sneeze. Use soap and water, and scrub for at least 20 seconds. If soap and water aren't available, use an alcohol-based hand . · Avoid touching your mouth, nose, and eyes. What can you do to avoid spreading the virus to others? To help avoid spreading the virus to others:  · Cover your mouth with a tissue when you cough or sneeze. Then throw the tissue in the trash. · Use a disinfectant to clean things that you touch often. · Stay home if you are sick or have been exposed to the virus. Don't go to school, work, or public areas. And don't use public transportation.   · If you are sick:  ? Leave your home only if you need to get medical care. But call the doctor's office first so they know you're coming. And wear a face mask, if you have one.  ? If you have a face mask, wear it whenever you're around other people. It can help stop the spread of the virus when you cough or sneeze. ? Clean and disinfect your home every day. Use household  and disinfectant wipes or sprays. Take special care to clean things that you grab with your hands. These include doorknobs, remote controls, phones, and handles on your refrigerator and microwave. And don't forget countertops, tabletops, bathrooms, and computer keyboards. When to call for help  Call 911 anytime you think you may need emergency care. For example, call if:  · You have severe trouble breathing. (You can't talk at all.)  · You have constant chest pain or pressure. · You are severely dizzy or lightheaded. · You are confused or can't think clearly. · Your face and lips have a blue color. · You pass out (lose consciousness) or are very hard to wake up. Call your doctor now if you develop symptoms such as:  · Shortness of breath. · Fever. · Cough. If you need to get care, call ahead to the doctor's office for instructions before you go. Make sure you wear a face mask, if you have one, to prevent exposing other people to the virus. Where can you get the latest information? The following health organizations are tracking and studying this virus. Their websites contain the most up-to-date information. Harleen Nam also learn what to do if you think you may have been exposed to the virus. · U.S. Centers for Disease Control and Prevention (CDC): The CDC provides updated news about the disease and travel advice. The website also tells you how to prevent the spread of infection. www.cdc.gov  · World Health Organization [facility]): WHO offers information about the virus outbreaks.  WHO also has travel advice. www.who.int  Current as of: April 1, 2020               Content Version: 12.4  © 4828-1352 [employer]wise, Incorporated. Care instructions adapted under license by your healthcare professional. If you have questions about a medical condition or this instruction, always ask your healthcare professional. [address] any warranty or liability for your use of this information.

## 2021-03-29 NOTE — PROGRESS NOTES

## 2021-03-29 NOTE — H&P
[address]   [address] [facility] [address], [address]   [phone]                                History and Physical     NAME: Yadira Christianson   :  1980   MRN:  [id number]     HPI:  The patient was seen and examined. Past Surgical History:   Procedure Laterality Date    HX  SECTION      [place]  DELIVERY ONLY           Past Medical History:   Diagnosis Date    Abnormal Papanicolaou smear of cervix     Chlamydia     during \"my early twenties\"    Genital herpes     last outbreak 3-4 weeks ago    Headache     Herpes gestationis     Other unknown and unspecified cause of morbidity or mortality     Migraine    Psychiatric problem     depression during this pregnancy     Social History     Tobacco Use    Smoking status: Former Smoker     Quit date: 2016     Years since quittin.4    Smokeless tobacco: Never Used    Tobacco comment: smoke dfro about 1 month due to stress   Substance Use Topics    Alcohol use: No     Alcohol/week: 1.0 standard drinks     Types: 1 Glasses of wine per week     Comment: social drinker when not pregnant    Drug use: No     Allergies   Allergen Reactions    Novocain [Procaine] Other (comments)     Shaking and feeling cold     Family History   Problem Relation Age of Onset    Hypertension Mother     Elevated Lipids Mother     Diabetes Maternal Aunt     Hypertension Maternal Aunt     Diabetes Paternal Aunt     Hypertension Paternal Aunt     Diabetes Paternal Grandfather     Hypertension Father     Elevated Lipids Father      No current facility-administered medications for this encounter. PHYSICAL EXAM:  General: Alert, cooperative, no acute distress    HEENT: NC, Atraumatic. PERRLA, EOMI. Anicteric sclerae.   Lungs:  CTA Bilaterally  Heart:  Regular  Rate and Rhythm  Abdomen: Soft, Non distended, Non tender.  +Bowel sounds  Extremities: No edema  Neurologic:  CN 2-12 grossly intact, Alert and oriented X 3.  No acute neurological distress   Psych:   Good insight. Not anxious nor agitated. The heart, lungs and mental status were satisfactory for the administration of sedation and for the procedure.       Mallampati score: 2       Assessment:   · Generalized abdominal pain, nausea, change in bowel habits    Plan:   · Endoscopic procedure  · MAC sedation   ·

## 2021-03-29 NOTE — ANESTHESIA POSTPROCEDURE EVALUATION
Post-Anesthesia Evaluation and Assessment    Patient: Tricia Whiting MRN: [id number]  SSN: [id number]    YOB: 1980  Age: 36 y.o. Sex: female      I have evaluated the patient and they are stable and ready for discharge from the PACU. Cardiovascular Function/Vital Signs  Visit Vitals  BP (!) 94/44   Pulse 78   Temp 36.4 °C (97.5 °F)   Resp 16   Ht 5' 1\" (1.549 m)   Wt 75.4 kg (166 lb 3.2 oz)   SpO2 100%   Breastfeeding No   BMI 31.40 kg/m²       Patient is status post MAC anesthesia for Procedure(s):  COLONOSCOPY/EGD   :-  ESOPHAGOGASTRODUODENOSCOPY (EGD)  ESOPHAGOGASTRODUODENAL (EGD) BIOPSY  COLON BIOPSY  ENDOSCOPIC POLYPECTOMY. Nausea/Vomiting: None    Postoperative hydration reviewed and adequate. Pain:  Pain Scale 1: Numeric (0 - 10) (03/29/21 1100)  Pain Intensity 1: 5(belly ache and H/A) (03/29/21 1100)   Managed    Neurological Status: At baseline    Mental Status, Level of Consciousness: Alert and  oriented to person, place, and time    Pulmonary Status:   O2 Device: Room air (03/29/21 1100)   Adequate oxygenation and airway patent    Complications related to anesthesia: None    Post-anesthesia assessment completed. No concerns    Signed By: Chapincito Bunn MD     March 29, 2021              Procedure(s):  COLONOSCOPY/EGD   :-  ESOPHAGOGASTRODUODENOSCOPY (EGD)  ESOPHAGOGASTRODUODENAL (EGD) BIOPSY  COLON BIOPSY  ENDOSCOPIC POLYPECTOMY. MAC    <BSHSIANPOST>    INITIAL Post-op Vital signs:   Vitals Value Taken Time   BP 94/56 03/29/21 1105   Temp 36.4 °C (97.5 °F) 03/29/21 1100   Pulse 79 03/29/21 1106   Resp 24 03/29/21 1106   SpO2 96 % 03/29/21 1106   Vitals shown include unvalidated device data.

## 2021-12-23 ENCOUNTER — DOCUMENTATION ONLY (OUTPATIENT)
Dept: FAMILY MEDICINE CLINIC | Age: 41
End: 2021-12-23

## 2022-03-19 PROBLEM — Z34.80 NORMAL PREGNANCY IN MULTIGRAVIDA: Status: ACTIVE | Noted: 2017-08-31

## 2023-05-22 RX ORDER — PANTOPRAZOLE SODIUM 40 MG/1
40 TABLET, DELAYED RELEASE ORAL DAILY
COMMUNITY
Start: 2019-07-15

## 2023-05-22 RX ORDER — IBUPROFEN 800 MG/1
TABLET ORAL
COMMUNITY
Start: 2020-05-28

## 2023-05-22 RX ORDER — ONDANSETRON 8 MG/1
8 TABLET, ORALLY DISINTEGRATING ORAL EVERY 8 HOURS PRN
COMMUNITY
Start: 2019-07-15

## 2023-05-22 RX ORDER — ACETAMINOPHEN 325 MG/1
325 TABLET ORAL EVERY 6 HOURS PRN
COMMUNITY

## 2023-10-13 ENCOUNTER — HOSPITAL ENCOUNTER (OUTPATIENT)
Facility: HOSPITAL | Age: 43
Setting detail: OBSERVATION
Discharge: HOME | End: 2023-10-14
Attending: EMERGENCY MEDICINE | Admitting: HOSPITALIST

## 2023-10-13 ENCOUNTER — APPOINTMENT (OUTPATIENT)
Facility: HOSPITAL | Age: 43
End: 2023-10-13

## 2023-10-13 DIAGNOSIS — R42 DIZZINESS: ICD-10-CM

## 2023-10-13 DIAGNOSIS — R07.9 CHEST PAIN, UNSPECIFIED TYPE: Primary | ICD-10-CM

## 2023-10-13 PROBLEM — G45.9 TIA (TRANSIENT ISCHEMIC ATTACK): Status: ACTIVE | Noted: 2023-10-13

## 2023-10-13 PROBLEM — R53.1 RIGHT SIDED WEAKNESS: Status: ACTIVE | Noted: 2023-10-13

## 2023-10-13 LAB
ALBUMIN SERPL-MCNC: 3.7 G/DL (ref 3.5–5)
ALBUMIN/GLOB SERPL: 0.9 (ref 1.1–2.2)
ALP SERPL-CCNC: 101 U/L (ref 45–117)
ALT SERPL-CCNC: 17 U/L (ref 12–78)
ANION GAP SERPL CALC-SCNC: 1 MMOL/L (ref 5–15)
APPEARANCE UR: CLEAR
AST SERPL-CCNC: 17 U/L (ref 15–37)
BACTERIA URNS QL MICRO: ABNORMAL /HPF
BASOPHILS # BLD: 0.1 K/UL (ref 0–0.1)
BASOPHILS NFR BLD: 1 % (ref 0–1)
BILIRUB SERPL-MCNC: 0.4 MG/DL (ref 0.2–1)
BILIRUB UR QL: NEGATIVE
BUN SERPL-MCNC: 13 MG/DL (ref 6–20)
BUN/CREAT SERPL: 19 (ref 12–20)
CALCIUM SERPL-MCNC: 9.6 MG/DL (ref 8.5–10.1)
CHLORIDE SERPL-SCNC: 106 MMOL/L (ref 97–108)
CO2 SERPL-SCNC: 32 MMOL/L (ref 21–32)
COLOR UR: ABNORMAL
COMMENT:: NORMAL
CREAT SERPL-MCNC: 0.7 MG/DL (ref 0.55–1.02)
D DIMER PPP FEU-MCNC: 0.37 MG/L FEU (ref 0–0.65)
DIFFERENTIAL METHOD BLD: ABNORMAL
EKG ATRIAL RATE: 69 BPM
EKG DIAGNOSIS: NORMAL
EKG P AXIS: 54 DEGREES
EKG P-R INTERVAL: 144 MS
EKG Q-T INTERVAL: 380 MS
EKG QRS DURATION: 82 MS
EKG QTC CALCULATION (BAZETT): 407 MS
EKG R AXIS: 22 DEGREES
EKG T AXIS: 21 DEGREES
EKG VENTRICULAR RATE: 69 BPM
EOSINOPHIL # BLD: 0.3 K/UL (ref 0–0.4)
EOSINOPHIL NFR BLD: 6 % (ref 0–7)
EPITH CASTS URNS QL MICRO: ABNORMAL /LPF
ERYTHROCYTE [DISTWIDTH] IN BLOOD BY AUTOMATED COUNT: 13.5 % (ref 11.5–14.5)
GLOBULIN SER CALC-MCNC: 4.2 G/DL (ref 2–4)
GLUCOSE BLD STRIP.AUTO-MCNC: 103 MG/DL (ref 65–117)
GLUCOSE SERPL-MCNC: 93 MG/DL (ref 65–100)
GLUCOSE UR STRIP.AUTO-MCNC: NEGATIVE MG/DL
HCG UR QL: NEGATIVE
HCT VFR BLD AUTO: 40.6 % (ref 35–47)
HGB BLD-MCNC: 12.6 G/DL (ref 11.5–16)
HGB UR QL STRIP: NEGATIVE
IMM GRANULOCYTES # BLD AUTO: 0 K/UL (ref 0–0.04)
IMM GRANULOCYTES NFR BLD AUTO: 0 % (ref 0–0.5)
KETONES UR QL STRIP.AUTO: NEGATIVE MG/DL
LEUKOCYTE ESTERASE UR QL STRIP.AUTO: NEGATIVE
LYMPHOCYTES # BLD: 2.2 K/UL (ref 0.8–3.5)
LYMPHOCYTES NFR BLD: 40 % (ref 12–49)
MCH RBC QN AUTO: 24.6 PG (ref 26–34)
MCHC RBC AUTO-ENTMCNC: 31 G/DL (ref 30–36.5)
MCV RBC AUTO: 79.3 FL (ref 80–99)
MONOCYTES # BLD: 0.3 K/UL (ref 0–1)
MONOCYTES NFR BLD: 6 % (ref 5–13)
NEUTS SEG # BLD: 2.6 K/UL (ref 1.8–8)
NEUTS SEG NFR BLD: 47 % (ref 32–75)
NITRITE UR QL STRIP.AUTO: NEGATIVE
NRBC # BLD: 0 K/UL (ref 0–0.01)
NRBC BLD-RTO: 0 PER 100 WBC
NT PRO BNP: 14 PG/ML
PH UR STRIP: 7.5 (ref 5–8)
PLATELET # BLD AUTO: 373 K/UL (ref 150–400)
PMV BLD AUTO: 9.7 FL (ref 8.9–12.9)
POTASSIUM SERPL-SCNC: 4 MMOL/L (ref 3.5–5.1)
PROT SERPL-MCNC: 7.9 G/DL (ref 6.4–8.2)
PROT UR STRIP-MCNC: NEGATIVE MG/DL
RBC # BLD AUTO: 5.12 M/UL (ref 3.8–5.2)
RBC #/AREA URNS HPF: ABNORMAL /HPF (ref 0–5)
SERVICE CMNT-IMP: NORMAL
SODIUM SERPL-SCNC: 139 MMOL/L (ref 136–145)
SP GR UR REFRACTOMETRY: 1.01 (ref 1–1.03)
SPECIMEN HOLD: NORMAL
TROPONIN I SERPL HS-MCNC: 4 NG/L (ref 0–51)
TROPONIN I SERPL HS-MCNC: 4 NG/L (ref 0–51)
UROBILINOGEN UR QL STRIP.AUTO: 1 EU/DL (ref 0.2–1)
WBC # BLD AUTO: 5.5 K/UL (ref 3.6–11)
WBC URNS QL MICRO: ABNORMAL /HPF (ref 0–4)

## 2023-10-13 PROCEDURE — 93010 ELECTROCARDIOGRAM REPORT: CPT | Performed by: INTERNAL MEDICINE

## 2023-10-13 PROCEDURE — 2060000000 HC ICU INTERMEDIATE R&B

## 2023-10-13 PROCEDURE — 6360000004 HC RX CONTRAST MEDICATION: Performed by: HOSPITALIST

## 2023-10-13 PROCEDURE — 70450 CT HEAD/BRAIN W/O DYE: CPT

## 2023-10-13 PROCEDURE — 6360000004 HC RX CONTRAST MEDICATION

## 2023-10-13 PROCEDURE — 6360000002 HC RX W HCPCS

## 2023-10-13 PROCEDURE — A9579 GAD-BASE MR CONTRAST NOS,1ML: HCPCS | Performed by: HOSPITALIST

## 2023-10-13 PROCEDURE — 0042T CT BRAIN PERFUSION: CPT

## 2023-10-13 PROCEDURE — 99254 IP/OBS CNSLTJ NEW/EST MOD 60: CPT | Performed by: PSYCHIATRY & NEUROLOGY

## 2023-10-13 PROCEDURE — 6370000000 HC RX 637 (ALT 250 FOR IP): Performed by: HOSPITALIST

## 2023-10-13 PROCEDURE — 2580000003 HC RX 258: Performed by: HOSPITALIST

## 2023-10-13 PROCEDURE — 83880 ASSAY OF NATRIURETIC PEPTIDE: CPT

## 2023-10-13 PROCEDURE — G0378 HOSPITAL OBSERVATION PER HR: HCPCS

## 2023-10-13 PROCEDURE — 97116 GAIT TRAINING THERAPY: CPT

## 2023-10-13 PROCEDURE — 96374 THER/PROPH/DIAG INJ IV PUSH: CPT

## 2023-10-13 PROCEDURE — 97535 SELF CARE MNGMENT TRAINING: CPT

## 2023-10-13 PROCEDURE — 97165 OT EVAL LOW COMPLEX 30 MIN: CPT

## 2023-10-13 PROCEDURE — 70498 CT ANGIOGRAPHY NECK: CPT

## 2023-10-13 PROCEDURE — 85379 FIBRIN DEGRADATION QUANT: CPT

## 2023-10-13 PROCEDURE — 6360000002 HC RX W HCPCS: Performed by: PSYCHIATRY & NEUROLOGY

## 2023-10-13 PROCEDURE — 93005 ELECTROCARDIOGRAM TRACING: CPT | Performed by: EMERGENCY MEDICINE

## 2023-10-13 PROCEDURE — 85025 COMPLETE CBC W/AUTO DIFF WBC: CPT

## 2023-10-13 PROCEDURE — 81025 URINE PREGNANCY TEST: CPT

## 2023-10-13 PROCEDURE — 81001 URINALYSIS AUTO W/SCOPE: CPT

## 2023-10-13 PROCEDURE — 6360000002 HC RX W HCPCS: Performed by: HOSPITALIST

## 2023-10-13 PROCEDURE — 82962 GLUCOSE BLOOD TEST: CPT

## 2023-10-13 PROCEDURE — 80053 COMPREHEN METABOLIC PANEL: CPT

## 2023-10-13 PROCEDURE — 70553 MRI BRAIN STEM W/O & W/DYE: CPT

## 2023-10-13 PROCEDURE — 99285 EMERGENCY DEPT VISIT HI MDM: CPT

## 2023-10-13 PROCEDURE — 97161 PT EVAL LOW COMPLEX 20 MIN: CPT

## 2023-10-13 PROCEDURE — 6360000002 HC RX W HCPCS: Performed by: EMERGENCY MEDICINE

## 2023-10-13 PROCEDURE — APPNB45 APP NON BILLABLE 31-45 MINUTES: Performed by: NURSE PRACTITIONER

## 2023-10-13 PROCEDURE — 84484 ASSAY OF TROPONIN QUANT: CPT

## 2023-10-13 PROCEDURE — 36415 COLL VENOUS BLD VENIPUNCTURE: CPT

## 2023-10-13 PROCEDURE — 96375 TX/PRO/DX INJ NEW DRUG ADDON: CPT

## 2023-10-13 RX ORDER — ASPIRIN 300 MG/1
300 SUPPOSITORY RECTAL DAILY
Status: DISCONTINUED | OUTPATIENT
Start: 2023-10-13 | End: 2023-10-14

## 2023-10-13 RX ORDER — POLYETHYLENE GLYCOL 3350 17 G/17G
17 POWDER, FOR SOLUTION ORAL DAILY PRN
Status: DISCONTINUED | OUTPATIENT
Start: 2023-10-13 | End: 2023-10-14 | Stop reason: HOSPADM

## 2023-10-13 RX ORDER — LORAZEPAM 2 MG/ML
1 INJECTION INTRAMUSCULAR ONCE
Status: COMPLETED | OUTPATIENT
Start: 2023-10-13 | End: 2023-10-13

## 2023-10-13 RX ORDER — SODIUM CHLORIDE 9 MG/ML
INJECTION, SOLUTION INTRAVENOUS PRN
Status: DISCONTINUED | OUTPATIENT
Start: 2023-10-13 | End: 2023-10-14 | Stop reason: HOSPADM

## 2023-10-13 RX ORDER — ENOXAPARIN SODIUM 100 MG/ML
40 INJECTION SUBCUTANEOUS DAILY
Status: DISCONTINUED | OUTPATIENT
Start: 2023-10-14 | End: 2023-10-14 | Stop reason: HOSPADM

## 2023-10-13 RX ORDER — SODIUM CHLORIDE 0.9 % (FLUSH) 0.9 %
5-40 SYRINGE (ML) INJECTION EVERY 12 HOURS SCHEDULED
Status: DISCONTINUED | OUTPATIENT
Start: 2023-10-13 | End: 2023-10-14 | Stop reason: HOSPADM

## 2023-10-13 RX ORDER — PANTOPRAZOLE SODIUM 40 MG/1
40 TABLET, DELAYED RELEASE ORAL DAILY
Status: DISCONTINUED | OUTPATIENT
Start: 2023-10-13 | End: 2023-10-14 | Stop reason: HOSPADM

## 2023-10-13 RX ORDER — KETOROLAC TROMETHAMINE 30 MG/ML
30 INJECTION, SOLUTION INTRAMUSCULAR; INTRAVENOUS ONCE
Status: DISCONTINUED | OUTPATIENT
Start: 2023-10-13 | End: 2023-10-14 | Stop reason: HOSPADM

## 2023-10-13 RX ORDER — HYDROCODONE BITARTRATE AND ACETAMINOPHEN 5; 325 MG/1; MG/1
1 TABLET ORAL EVERY 6 HOURS PRN
Status: DISCONTINUED | OUTPATIENT
Start: 2023-10-13 | End: 2023-10-14 | Stop reason: HOSPADM

## 2023-10-13 RX ORDER — CLINDAMYCIN HYDROCHLORIDE 150 MG/1
300 CAPSULE ORAL EVERY 8 HOURS SCHEDULED
Status: DISCONTINUED | OUTPATIENT
Start: 2023-10-13 | End: 2023-10-14 | Stop reason: HOSPADM

## 2023-10-13 RX ORDER — ONDANSETRON 2 MG/ML
INJECTION INTRAMUSCULAR; INTRAVENOUS
Status: COMPLETED
Start: 2023-10-13 | End: 2023-10-13

## 2023-10-13 RX ORDER — SODIUM CHLORIDE 0.9 % (FLUSH) 0.9 %
5-40 SYRINGE (ML) INJECTION PRN
Status: DISCONTINUED | OUTPATIENT
Start: 2023-10-13 | End: 2023-10-14 | Stop reason: HOSPADM

## 2023-10-13 RX ORDER — ONDANSETRON 4 MG/1
4 TABLET, ORALLY DISINTEGRATING ORAL EVERY 8 HOURS PRN
Status: DISCONTINUED | OUTPATIENT
Start: 2023-10-13 | End: 2023-10-14 | Stop reason: HOSPADM

## 2023-10-13 RX ORDER — CLINDAMYCIN HYDROCHLORIDE 300 MG/1
CAPSULE ORAL
COMMUNITY
Start: 2023-10-09

## 2023-10-13 RX ORDER — ONDANSETRON 2 MG/ML
4 INJECTION INTRAMUSCULAR; INTRAVENOUS EVERY 6 HOURS PRN
Status: DISCONTINUED | OUTPATIENT
Start: 2023-10-13 | End: 2023-10-14 | Stop reason: HOSPADM

## 2023-10-13 RX ORDER — ENOXAPARIN SODIUM 100 MG/ML
40 INJECTION SUBCUTANEOUS DAILY
Status: DISCONTINUED | OUTPATIENT
Start: 2023-10-13 | End: 2023-10-13

## 2023-10-13 RX ORDER — ATORVASTATIN CALCIUM 40 MG/1
80 TABLET, FILM COATED ORAL NIGHTLY
Status: DISCONTINUED | OUTPATIENT
Start: 2023-10-13 | End: 2023-10-14

## 2023-10-13 RX ORDER — METOCLOPRAMIDE HYDROCHLORIDE 5 MG/ML
10 INJECTION INTRAMUSCULAR; INTRAVENOUS ONCE
Status: COMPLETED | OUTPATIENT
Start: 2023-10-13 | End: 2023-10-13

## 2023-10-13 RX ORDER — ONDANSETRON 2 MG/ML
4 INJECTION INTRAMUSCULAR; INTRAVENOUS ONCE
Status: COMPLETED | OUTPATIENT
Start: 2023-10-13 | End: 2023-10-13

## 2023-10-13 RX ORDER — ASPIRIN 81 MG/1
81 TABLET, CHEWABLE ORAL DAILY
Status: DISCONTINUED | OUTPATIENT
Start: 2023-10-13 | End: 2023-10-14 | Stop reason: HOSPADM

## 2023-10-13 RX ADMIN — ONDANSETRON 4 MG: 2 INJECTION INTRAMUSCULAR; INTRAVENOUS at 09:24

## 2023-10-13 RX ADMIN — ATORVASTATIN CALCIUM 80 MG: 40 TABLET, FILM COATED ORAL at 21:15

## 2023-10-13 RX ADMIN — SODIUM CHLORIDE, PRESERVATIVE FREE 10 ML: 5 INJECTION INTRAVENOUS at 12:17

## 2023-10-13 RX ADMIN — CLINDAMYCIN HYDROCHLORIDE 300 MG: 150 CAPSULE ORAL at 13:57

## 2023-10-13 RX ADMIN — PANTOPRAZOLE SODIUM 40 MG: 40 TABLET, DELAYED RELEASE ORAL at 12:17

## 2023-10-13 RX ADMIN — CLINDAMYCIN HYDROCHLORIDE 300 MG: 150 CAPSULE ORAL at 21:48

## 2023-10-13 RX ADMIN — IOPAMIDOL 120 ML: 755 INJECTION, SOLUTION INTRAVENOUS at 10:11

## 2023-10-13 RX ADMIN — METOCLOPRAMIDE 10 MG: 5 INJECTION, SOLUTION INTRAMUSCULAR; INTRAVENOUS at 13:58

## 2023-10-13 RX ADMIN — GADOTERIDOL 15 ML: 279.3 INJECTION, SOLUTION INTRAVENOUS at 23:31

## 2023-10-13 RX ADMIN — SODIUM CHLORIDE, PRESERVATIVE FREE 10 ML: 5 INJECTION INTRAVENOUS at 21:15

## 2023-10-13 RX ADMIN — LORAZEPAM 1 MG: 2 INJECTION INTRAMUSCULAR; INTRAVENOUS at 22:59

## 2023-10-13 RX ADMIN — LORAZEPAM 1 MG: 2 INJECTION INTRAMUSCULAR; INTRAVENOUS at 09:43

## 2023-10-13 RX ADMIN — ASPIRIN 81 MG CHEWABLE TABLET 81 MG: 81 TABLET CHEWABLE at 12:17

## 2023-10-13 ASSESSMENT — ENCOUNTER SYMPTOMS
STRIDOR: 0
EYES NEGATIVE: 1
VOMITING: 1
BACK PAIN: 0
CHEST TIGHTNESS: 0
WHEEZING: 0
NAUSEA: 1
BLOOD IN STOOL: 0
ABDOMINAL PAIN: 0
DIARRHEA: 0
SINUS PRESSURE: 0
RHINORRHEA: 0
SHORTNESS OF BREATH: 0
TROUBLE SWALLOWING: 0
COLOR CHANGE: 0
COUGH: 0
SINUS PAIN: 0
SORE THROAT: 0

## 2023-10-13 ASSESSMENT — PAIN DESCRIPTION - LOCATION: LOCATION: HEAD

## 2023-10-13 ASSESSMENT — PAIN SCALES - GENERAL: PAINLEVEL_OUTOF10: 4

## 2023-10-13 NOTE — ED NOTES
Pt reports her \"eyes were twitching\" when she had the episode.  Pt medicated and taken to CT by this RN     Elodia Coronado RN  10/13/23 1903

## 2023-10-13 NOTE — ED NOTES
Pt declined toradol at this time but will alert RN if she would like at a later time. Offered to assist pt into gown but pt declined, would prefer to stay in her clothes.  Non slip socks provided     Candelaria Gutierrez RN  10/13/23 8897

## 2023-10-13 NOTE — PROGRESS NOTES
Neurocritical Care Code Stroke Documentation      Symptoms:   Patient was reportedly driving and had an acute onset of chest tightness with associated dizziness (feels like she is swaying) and nausea. She reports having a toothache and has been on antibiotics along with Tylenol and Ibuprofen for pain. She reports having an opiate as well for pain but only takes it at night. She reports having some chest pain and nausea yesterday. She has had a headache all week. Patient denies fever. Last Known Well: 0815 am this morning    Medical hx:   Past Medical History:   Diagnosis Date    Abnormal Papanicolaou smear of cervix     Chlamydia     during \"my early twenties\"    Genital herpes     last outbreak 3-4 weeks ago    Headache     Herpes gestationis     Other unknown and unspecified cause of morbidity or mortality     Migraine    Psychiatric problem     depression during this pregnancy      Anticoagulation: None    VAN:   Negative   NIHSS:   1a-LOC:0    1b-Month/Age:0    1c-Open/Close Hand:0    2-Best Gaze:0    3-Visual Fields:0    4-Facial Palsy:0    5a-Left Arm:0    5b-Right Arm:0    6a-Left Le    6b-Right Le    7-Limb Ataxia:0    8-Sensory:0    9-Best Language:0    10-Dysarthria:0    11-Extinction/Inattention:0  TOTAL SCORE:2   Imaging:   CT: No acute intracranial findings. Incidental findings include mild caudal  displacement of cerebellar tonsils and posterior fossa arachnoid cyst.    CTA/CTP: No large vessel occlusion or hemodynamically significant stenosis. No aneurysm  or dissection. No perfusion abnormality. Plan:   TNK Candidate: NO    Mechanical thrombectomy Candidate: NO   She was noted to have chills in CT scanner. Initially seemed equal in all extremities with teleneuro, but then noted right leg drift on exam. I had telesunita King re-evaluate patient in CT and she was then noted to have right arm drift and right leg drift.  We were having issues with teleneuro machine shutting off and

## 2023-10-13 NOTE — PLAN OF CARE
Problem: Occupational Therapy - Adult  Goal: By Discharge: Performs self-care activities at highest level of function for planned discharge setting. See evaluation for individualized goals. Description: FUNCTIONAL STATUS PRIOR TO ADMISSION:  Patient is highly IND with ADLs/IADLs and mobility with no AD. Works as a . HOME SUPPORT: Patient lived with her daughters and grandson but didn't require assistance. Occupational Therapy Goals  Initiated 10/13/2023   1. Patient will perform grooming with [place] within 7 day(s). 2.  Patient will perform bathing with [place] within 7 day(s). 3.  Patient will perform upper and lower body dressing with [place] within 7 day(s). 4.  Patient will perform toilet transfers with [place] within 7 day(s). 5.  Patient will perform all aspects of toileting with [place] within 7 day(s). 6.  Patient will participate in upper extremity therapeutic exercise/activities with [place] within 7 day(s). 7.  Patient will utilize energy conservation techniques during functional activities with verbal and visual cues within 7 day(s). 8.  Patient will complete the Proxinoafort within 7 days. Outcome: Progressing     OCCUPATIONAL THERAPY EVALUATION    Patient: Pardeep Brand (56 y.o. female)  Date: 10/13/2023  Primary Diagnosis: TIA (transient ischemic attack) [G45.9]         Precautions:     ASSESSMENT :  The patient is limited by decreased functional mobility, independence in ADLs/IADLs, ROM, strength, activity tolerance, safety awareness, cognition, coordination, balance, vision (nystagmus with horizontal tracking) s/p admission for HA, dizziness, and brief CP, CT indicating an incidental finding of mild caudal displacement of cerebellar tonsils and posterior fossa arachnoid cyst, MRI pending. At baseline, she is highly IND, lives with her family, working, driving.  She now presents slightly below her baseline, inconsistent RUE x3    COMMUNICATION/EDUCATION:   The patient's plan of care was discussed with: physical therapist and registered nurse    Patient Education  Education Given To: Patient  Education Provided: Role of Therapy;Plan of Care;Precautions; ADL Adaptive Strategies;Transfer Training;Energy Conservation; Fall Prevention Strategies; Low Vision Education  Education Method: Demonstration;Verbal;Teach Back  Barriers to Learning: None  Education Outcome: Verbalized understanding;Demonstrated understanding;Continued education needed    Thank you for this referral.  SHARON Hugo, OTR/L  Minutes: 20    Occupational Therapy Evaluation Charge Determination   History Examination Decision-Making   LOW Complexity : Brief history review  LOW Complexity: 1-3 Performance deficits relating to physical, cognitive, or psychosocial skills that result in activity limitations and/or participation restrictions LOW Complexity: No comorbidities that affect functional and  no verbal  or physical assist needed to complete eval tasks   Based on the above components, the patient evaluation is determined to be of the following complexity level: Low

## 2023-10-13 NOTE — ED NOTES
RN heard pt screaming loudly, entered room and pt screaming and clutching abd. Pt tearful, RN speaking with pt asking her to describe s/s.  Pt stops screaming and states \"my head\" Dr Lorrie Barakat aware and verbal order for lorazepam 1 mg received     Annelise Saenz RN  10/13/23 6714

## 2023-10-13 NOTE — PLAN OF CARE
Problem: Physical Therapy - Adult  Goal: By Discharge: Performs mobility at highest level of function for planned discharge setting. See evaluation for individualized goals. Description: FUNCTIONAL STATUS PRIOR TO ADMISSION: Patient was independent and active without use of DME.    HOME SUPPORT PRIOR TO ADMISSION: The patient lived with family but did not require assistance. Physical Therapy Goals  Initiated 10/13/2023  1. Patient will move from supine to sit and sit to supine and roll side to side in bed with independence within 7 day(s). 2.  Patient will perform sit to stand with independence within 7 day(s). 3.  Patient will transfer from bed to chair and chair to bed with independence using the least restrictive device within 7 day(s). 4.  Patient will ambulate with independence for 500 feet with the least restrictive device within 7 day(s). Outcome: Progressing   PHYSICAL THERAPY EVALUATION    Patient: Pardeep Brand (46 y.o. female)  Date: 10/13/2023  Primary Diagnosis: TIA (transient ischemic attack) [G45.9]       Precautions:                      ASSESSMENT :   DEFICITS/IMPAIRMENTS:   The patient is limited by decreased functional mobility, strength, activity tolerance, balance, gait s/p presentation on 10/13 with chest pain, headache, and dizziness. Head CT: No acute intracranial findings. Incidental findings include mild caudal displacement of cerebellar tonsils and posterior fossa arachnoid cyst. Brain MRI pending. Based on the impairments listed above pt with inconsistent strength findings of RLE quad and hip flexion, but on functional assessment WFL. Pt with slight nystagmus (L and R direction) on exam, but did not necessarily provoke previous symptoms. Pt mobilized at mod I-SBA without assistive device. Pt without LOB or path deviations. Pt reported feeling \"generally unwell, weaker in her upper body. \" RN is aware.  Anticipate pt will be able to return home with family Pain Ratin/10   Pain Intervention(s):        Activity Tolerance:   Good    After treatment:   Patient left in no apparent distress in bed, Call bell within reach, and Side rails x3    COMMUNICATION/EDUCATION:   The patient's plan of care was discussed with: occupational therapist and registered nurse    Patient Education  Education Given To: Patient  Education Provided: Role of Therapy;Plan of Care  Education Method: Verbal  Barriers to Learning: None    Thank you for this referral.  Gabbie Mcmillan, PT, DPT  Minutes: 24

## 2023-10-13 NOTE — ED NOTES
8:40 AM    Patient is an 43 y.o. with no significant past medical history who presents to the ER with reports of dizziness that she describes lightheadedness without the room spinning. Patient reports she also has a headache with nausea and vomiting. Patient denies history of CVA or TIA. Patient reports she also started taking Clindamycin recently for a dental infection. I have evaluated the patient as the Provider in Rapid Medical Evaluation (RME). I have reviewed her vital signs and the triage nurse assessment. I have talked with the patient and any available family and advised that I am the provider in triage and have ordered the appropriate study to initiate their work up based on the clinical presentation during my assessment. I have advised that the patient will be accommodated in the Main ED as soon as possible. I have also requested to contact the triage nurse or myself immediately if the patient experiences any changes in their condition during this brief waiting period.   [facility], PA-C       Jackelyn Tan  10/13/23 8806

## 2023-10-13 NOTE — ED NOTES
Pt ambulatory to and from bathroom with steady gait. Pt requesting something topical for the ulcer in mouth.  Perfectserve sent to Dr Otis Orellana RN  10/13/23 1402

## 2023-10-13 NOTE — ED NOTES
TRANSFER - OUT REPORT:    Verbal report given to [address] on Kaylie Mccabe  being transferred to [id number] for routine progression of patient care       Report consisted of patient's Situation, Background, Assessment and   Recommendations(SBAR). Information from the following report(s) Nurse Handoff Report, Index, ED Encounter Summary, ED SBAR, Intake/Output, and Recent Results was reviewed with the receiving nurse. [place] Fall Assessment:    Presents to emergency department  because of falls (Syncope, seizure, or loss of consciousness): No  Age > 70: No  Altered Mental Status, Intoxication with alcohol or substance confusion (Disorientation, impaired judgment, poor safety awaremess, or inability to follow instructions): No  Impaired Mobility: Ambulates or transfers with assistive devices or assistance; Unable to ambulate or transer.: No  Nursing Judgement: Yes          Lines:   Peripheral IV 10/13/23 Left;Proximal Forearm (Active)        Opportunity for questions and clarification was provided.       Patient transported with:  Raffy Moon RN  10/13/23 5725

## 2023-10-13 NOTE — CONSULTS
[address]    Name:  Gregor Schwab  MR#:  [id number]  :  1980  ACCOUNT #:  [de-identified]  DATE OF SERVICE:  10/13/2023      REQUESTING PHYSICIAN:  Harry Schaefer MD    REASON FOR EVALUATION:  Headache, dizziness, right-sided weakness, chest pain. HISTORY:  The patient is a 63-year-old female with no significant medical issues except for migraine headaches who had been dealing with some pain in the right upper tooth related to infection and has been taking antibiotics. She has not been sleeping very well. Earlier today, she was driving to work when she started to experience chest discomfort that was radiating up to the neck. She continued to experience this and started to feel dizzy, which prompted the ER visit. When she arrived here, she was noted to have weakness in the right upper and lower extremities and her headache got more intense. She was admitted for further workup. Code Stroke was alerted and she had CTA of the head and neck which was negative. Overall, she feels slightly better and chest discomfort has improved, but still has headache and her right leg still feels heavier than the other. Denies any nausea, vomiting, changes in vision, speech, shortness of breath, or palpitations. PAST MEDICAL HISTORY:  As mentioned above. HOME MEDICATIONS:  1. Tylenol or Advil as needed. 2.  Pantoprazole 40 mg daily. ALLERGIES:  PROCAINE. FAMILY HISTORY:  Positive for dyslipidemia in father and mother. Both parents also had hypertension. SOCIAL HISTORY:  Quit smoking about 7 years ago. She teaches in an elementary school. Does not drink alcohol. PHYSICAL EXAMINATION:  GENERAL:  The patient is alert, fully oriented. VITAL SIGNS:  Blood pressure 165/63 initially which is now improved to 122/82, pulse is 60, temperature 98. 6. NEUROLOGIC:  Pupils are equal, round, and reactive. Extraocular movements are full. Face is symmetric.   Tongue is

## 2023-10-13 NOTE — ED TRIAGE NOTES
Pt c/o dizziness that started this am at 0815, +headache, denies blood thinners, +nausea, +chest pain, denies numbness/tingling to face, arms or legs, denies head trauma

## 2023-10-13 NOTE — CONSULTS
Consult dictated. 55-year-old with history of migraines who has been dealing with right-sided upper tooth infection for the past week, not sleeping well, somewhat anxious and stressed who developed chest discomfort while driving to work earlier today that was not relieving. She came to the emergency department and also started to feel dizzy, headache got worse and she was feeling weak on the right side. Suspect that she may be experiencing complicated migraine but reasonable to check MRI brain. CTA was negative.   CT brain with incidental findings of slight displacement cerebellar tonsils and posterior fossa arachnoid cyst, not of clinical significance  Will try Toradol and Reglan and follow-up on MRI    Dm Basurto MD

## 2023-10-13 NOTE — ED NOTES
MRI screening sheet complete. Will have  scan into computer. Pt updated on plan of care. All needs addressed.  Daughter at bedside      Columba Cee RN  10/13/23 9984

## 2023-10-13 NOTE — H&P
History and Physical    Date of Service:  10/13/2023  Primary Care Provider: FRANK Farah CNP  Source of information: The patient, Chart review, and Spouse/family member    Chief Complaint: Dizziness      History of Presenting Illness:   Sudheer Morris is a 43 y.o. female initially presented to the ED for mid chest pressure type chest pain. She was driving her daughter to school when she experienced a head sudden onset of mid chest pain. No associated nausea or vomiting, diaphoresis but she felt dizzy. She also complains of headache. She was concerned as she had episode of severe chest pain about a year ago but she was checked out by EMS she said she was told everything was okay and was not taken to the ED nor did she have outpatient evaluation. No history of cardiovascular illness, not hypertensive or diabetic, smokes a cigarette here and here. No history of stroke, MI, CHF, PAD. In the ED feels very dizzy and also started complaining of right-sided weakness. Code stroke was activated, teleneurologist evaluated. Per ED, teleneurologist think stroke is unlikely, recommended admission for observation and further evaluation. Patient is currently taking clindamycin for dental infection, she also uses Norco at night and ibuprofen daytime as needed. She denied fever or chills, cough. Noncontrast head CT negative, except incidental findings of caudal displacement of the cerebellar tonsils and posterior fossa arachnoid cyst.  No perfusion abnormality. REVIEW OF SYSTEMS:  A comprehensive review of systems was negative except for that written in the History of Present Illness.      Past Medical History:   Diagnosis Date    Abnormal Papanicolaou smear of cervix     Chlamydia     during \"my early twenties\"    Genital herpes     last outbreak 3-4 weeks ago    Headache     Herpes gestationis     Other unknown and unspecified cause of morbidity or mortality     Migraine [phone]  Relation: Other      CRITICAL CARE WAS PERFORMED FOR THIS ENCOUNTER: NO.      Signed By: Keila Rush MD     October 13, 2023         Please note that this dictation may have been completed with Dragon, the computer voice recognition software. Quite often unanticipated grammatical, syntax, homophones, and other interpretive errors are inadvertently transcribed by the computer software. Please disregard these errors. Please excuse any errors that have escaped final proofreading.

## 2023-10-14 ENCOUNTER — APPOINTMENT (OUTPATIENT)
Facility: HOSPITAL | Age: 43
End: 2023-10-14
Attending: HOSPITALIST

## 2023-10-14 VITALS
BODY MASS INDEX: 31.88 KG/M2 | WEIGHT: 168.87 LBS | HEIGHT: 61 IN | SYSTOLIC BLOOD PRESSURE: 102 MMHG | HEART RATE: 83 BPM | DIASTOLIC BLOOD PRESSURE: 56 MMHG | TEMPERATURE: 98.3 F | OXYGEN SATURATION: 100 % | RESPIRATION RATE: 21 BRPM

## 2023-10-14 PROBLEM — G43.109 COMPLICATED MIGRAINE: Status: ACTIVE | Noted: 2023-10-13

## 2023-10-14 LAB
CHOLEST SERPL-MCNC: 181 MG/DL
ERYTHROCYTE [DISTWIDTH] IN BLOOD BY AUTOMATED COUNT: 13.8 % (ref 11.5–14.5)
EST. AVERAGE GLUCOSE BLD GHB EST-MCNC: 120 MG/DL
HBA1C MFR BLD: 5.8 % (ref 4–5.6)
HCT VFR BLD AUTO: 37.1 % (ref 35–47)
HDLC SERPL-MCNC: 45 MG/DL
HDLC SERPL: 4 (ref 0–5)
HGB BLD-MCNC: 11.5 G/DL (ref 11.5–16)
LDLC SERPL CALC-MCNC: 109.2 MG/DL (ref 0–100)
MCH RBC QN AUTO: 24.6 PG (ref 26–34)
MCHC RBC AUTO-ENTMCNC: 31 G/DL (ref 30–36.5)
MCV RBC AUTO: 79.3 FL (ref 80–99)
NRBC # BLD: 0 K/UL (ref 0–0.01)
NRBC BLD-RTO: 0 PER 100 WBC
PLATELET # BLD AUTO: 323 K/UL (ref 150–400)
PMV BLD AUTO: 9.9 FL (ref 8.9–12.9)
RBC # BLD AUTO: 4.68 M/UL (ref 3.8–5.2)
TRIGL SERPL-MCNC: 134 MG/DL
TROPONIN I SERPL HS-MCNC: 4 NG/L (ref 0–51)
TROPONIN I SERPL HS-MCNC: 5 NG/L (ref 0–51)
VLDLC SERPL CALC-MCNC: 26.8 MG/DL
WBC # BLD AUTO: 5.8 K/UL (ref 3.6–11)

## 2023-10-14 PROCEDURE — 2580000003 HC RX 258: Performed by: HOSPITALIST

## 2023-10-14 PROCEDURE — 83036 HEMOGLOBIN GLYCOSYLATED A1C: CPT

## 2023-10-14 PROCEDURE — 84484 ASSAY OF TROPONIN QUANT: CPT

## 2023-10-14 PROCEDURE — 80061 LIPID PANEL: CPT

## 2023-10-14 PROCEDURE — 36415 COLL VENOUS BLD VENIPUNCTURE: CPT

## 2023-10-14 PROCEDURE — G0378 HOSPITAL OBSERVATION PER HR: HCPCS

## 2023-10-14 PROCEDURE — 6370000000 HC RX 637 (ALT 250 FOR IP): Performed by: HOSPITALIST

## 2023-10-14 PROCEDURE — 99231 SBSQ HOSP IP/OBS SF/LOW 25: CPT | Performed by: PSYCHIATRY & NEUROLOGY

## 2023-10-14 PROCEDURE — 85027 COMPLETE CBC AUTOMATED: CPT

## 2023-10-14 RX ORDER — ASPIRIN 81 MG/1
81 TABLET, CHEWABLE ORAL DAILY
Qty: 30 TABLET | Refills: 0 | Status: SHIPPED | OUTPATIENT
Start: 2023-10-15

## 2023-10-14 RX ORDER — ATORVASTATIN CALCIUM 10 MG/1
10 TABLET, FILM COATED ORAL NIGHTLY
Qty: 30 TABLET | Refills: 0 | Status: SHIPPED | OUTPATIENT
Start: 2023-10-14

## 2023-10-14 RX ORDER — ATORVASTATIN CALCIUM 10 MG/1
10 TABLET, FILM COATED ORAL NIGHTLY
Status: DISCONTINUED | OUTPATIENT
Start: 2023-10-14 | End: 2023-10-14 | Stop reason: HOSPADM

## 2023-10-14 RX ADMIN — HYDROCODONE BITARTRATE AND ACETAMINOPHEN 1 TABLET: 5; 325 TABLET ORAL at 16:32

## 2023-10-14 RX ADMIN — ASPIRIN 81 MG CHEWABLE TABLET 81 MG: 81 TABLET CHEWABLE at 09:18

## 2023-10-14 RX ADMIN — CLINDAMYCIN HYDROCHLORIDE 300 MG: 150 CAPSULE ORAL at 06:15

## 2023-10-14 RX ADMIN — HYDROCODONE BITARTRATE AND ACETAMINOPHEN 1 TABLET: 5; 325 TABLET ORAL at 06:15

## 2023-10-14 RX ADMIN — SODIUM CHLORIDE, PRESERVATIVE FREE 7 ML: 5 INJECTION INTRAVENOUS at 09:19

## 2023-10-14 RX ADMIN — CLINDAMYCIN HYDROCHLORIDE 300 MG: 150 CAPSULE ORAL at 13:07

## 2023-10-14 RX ADMIN — PANTOPRAZOLE SODIUM 40 MG: 40 TABLET, DELAYED RELEASE ORAL at 09:18

## 2023-10-14 ASSESSMENT — PAIN SCALES - GENERAL: PAINLEVEL_OUTOF10: 6

## 2023-10-14 ASSESSMENT — PAIN DESCRIPTION - LOCATION: LOCATION: EAR;HEAD

## 2023-10-14 NOTE — CARE COORDINATION
Medicare Outpatient Observation Notice (MOON)/ Formerly [facility] Outpatient Observation Notice (Bernard Brash) provided to patient/representative with verbal explanation of the notice. Time allotted for questions regarding the notice. Patient /representative provided a completed copy of the MOON/VOON notice. Copy placed on bedside chart. CM met with patient in her room ton introduce self and explain role. Observation notice delivered to patient and questions answered. Admission  TIA  Patient has [place] Medicaid    Patient lives with her family and was independent and without the use of dme prior to admission    Evaluated by therapy-- no skilled needs recommended upon discharge. Patient drive herself to the hospital and plans to drive home.       CM remains available to assist with nay needs that arise    BOB Fam

## 2023-10-14 NOTE — PLAN OF CARE
Problem: Physical Therapy - Adult  Goal: By Discharge: Performs mobility at highest level of function for planned discharge setting. See evaluation for individualized goals. Description: FUNCTIONAL STATUS PRIOR TO ADMISSION: Patient was independent and active without use of DME.    HOME SUPPORT PRIOR TO ADMISSION: The patient lived with family but did not require assistance. Physical Therapy Goals  Initiated 10/13/2023  1. Patient will move from supine to sit and sit to supine and roll side to side in bed with independence within 7 day(s). 2.  Patient will perform sit to stand with independence within 7 day(s). 3.  Patient will transfer from bed to chair and chair to bed with independence using the least restrictive device within 7 day(s). 4.  Patient will ambulate with independence for 500 feet with the least restrictive device within 7 day(s). 10/13/2023 1503 by Harsh Pillai PT  Outcome: Progressing     Problem: Occupational Therapy - Adult  Goal: By Discharge: Performs self-care activities at highest level of function for planned discharge setting. See evaluation for individualized goals. Description: FUNCTIONAL STATUS PRIOR TO ADMISSION:  Patient is highly IND with ADLs/IADLs and mobility with no AD. Works as a . HOME SUPPORT: Patient lived with her daughters and grandson but didn't require assistance. Occupational Therapy Goals  Initiated 10/13/2023   1. Patient will perform grooming with [place] within 7 day(s). 2.  Patient will perform bathing with [place] within 7 day(s). 3.  Patient will perform upper and lower body dressing with [place] within 7 day(s). 4.  Patient will perform toilet transfers with [place] within 7 day(s). 5.  Patient will perform all aspects of toileting with [place] within 7 day(s). 6.  Patient will participate in upper extremity therapeutic exercise/activities with [place] within 7 day(s).     7. Patient will utilize energy conservation techniques during functional activities with verbal and visual cues within 7 day(s). 8.  Patient will complete the [place] within 7 days.     10/13/2023 1505 by Gabriela Araujo OT  Outcome: Progressing     Problem: Discharge Planning  Goal: Discharge to home or other facility with appropriate resources  Outcome: Progressing     Problem: Pain  Goal: Verbalizes/displays adequate comfort level or baseline comfort level  Outcome: Progressing

## 2023-10-14 NOTE — PROGRESS NOTES
Hospitalist Progress Note  Sima Nova MD  Answering service:         Date of Service:  10/14/2023  NAME:  Froilan Marinelli  :  1980  MRN:  [id number]      Admission Summary:     Patient presents with chest pain, code stroke called in the ER due to sudden onset of dizziness with right-sided weakness. Interval history / Subjective:     Right-sided weakness has resolved. Assessment & Plan:     Right-sided weakness  -Initial CT head negative for acute pathology, CT perfusion negative for perfusion abnormality, CTA of the head and neck shows no large vessel occlusion or hemodynamically significant stenosis. -MRI of the brain pending, echo pending, , hemoglobin A1c 5.8  -Neurology following    Chest pain  -No clinical signs or symptoms of ACS, troponins negative  -Follow echo    Prediabetes  -Patient with hemoglobin A1c of 5.8  -Outpatient follow-up    Dental infection  -Continue clindamycin    Tobacco use  -Counseling done     Code status: Full  Prophylaxis: SCDs  Care Plan discussed with: Patient  Anticipated Disposition: 24 to 48 hours             Review of Systems:   Pertinent items are noted in HPI. Vital Signs:    Last 24hrs VS reviewed since prior progress note.  Most recent are:  Vitals:    10/14/23 0600   BP:    Pulse: 87   Resp:    Temp:    SpO2:        No intake or output data in the 24 hours ending 10/14/23 1314     Physical Examination:     I had a face to face encounter with this patient and independently examined them on 10/14/2023 as outlined below:          General : alert x 3, awake, no acute distress,   HEENT: PEERL, EOMI, moist mucus membrane, TM clear  Neck: supple, no JVD, no meningeal signs  Chest: Clear to auscultation bilaterally   CVS: S1 S2 heard, Capillary refill less than 2 seconds  Abd: soft/ non tender, non distended, BS physiological,   Ext: no clubbing, no cyanosis, no edema, brisk 2+ DP pulses  Neuro/Psych: pleasant mood and affect, CN 2-12 grossly intact, sensory grossly within normal limit, Strength 5/5 in all extremities, DTR 1+ x 4  Skin: warm            Data Review:    Review and/or order of clinical lab test    I have independently reviewed and interpreted patient's lab and all other diagnostic data    Notes reviewed from all clinical/nonclinical/nursing services involved in patient's clinical care. Care coordination discussions were held with appropriate clinical/nonclinical/ nursing providers based on care coordination needs. Labs:     Recent Labs     10/13/23  0907 10/14/23  0048   WBC 5.5 5.8   HGB 12.6 11.5   HCT 40.6 37.1    323     Recent Labs     10/13/23  0907      K 4.0      CO2 32   BUN 13     Recent Labs     10/13/23  0907   ALT 17   GLOB 4.2*     No results for input(s): \"INR\", \"APTT\" in the last 72 hours. Invalid input(s): \"PTP\"   No results for input(s): \"TIBC\", \"FERR\" in the last 72 hours. Invalid input(s): \"FE\", \"PSAT\"   No results found for: \"FOL\", \"RBCF\"   No results for input(s): \"PH\", \"PCO2\", \"PO2\" in the last 72 hours. No results for input(s): \"CPK\" in the last 72 hours.     Invalid input(s): \"CPKMB\", \"CKNDX\", \"TROIQ\"  Lab Results   Component Value Date/Time    CHOL 181 10/14/2023 12:48 AM    HDL 45 10/14/2023 12:48 AM     No results found for: \"GLUCPOC\"  [unfilled]      Medications Reviewed:     Current Facility-Administered Medications   Medication Dose Route Frequency    sodium chloride flush 0.9 % injection 5-40 mL  5-40 mL IntraVENous 2 times per day    sodium chloride flush 0.9 % injection 5-40 mL  5-40 mL IntraVENous PRN    0.9 % sodium chloride infusion   IntraVENous PRN    ondansetron (ZOFRAN-ODT) disintegrating tablet 4 mg  4 mg Oral Q8H PRN    Or    ondansetron (ZOFRAN) injection 4 mg  4 mg IntraVENous Q6H PRN    polyethylene glycol (GLYCOLAX) packet 17 g  17 g Oral Daily PRN    atorvastatin

## 2023-10-14 NOTE — PROGRESS NOTES
Neurology Progress Note     NAME: Hannah Lynn   :  1980   MRN:  [id number]   DATE:  10/14/2023    Assessment:     Principal Problem:    Complicated migraine  Active Problems:    Dizziness    Right sided weakness  Resolved Problems:    * No resolved hospital problems. *    Pt is a 35yo RH female with h/o migraine headaches, recent tooth infection, and poor sleep, admitted 10/13/23 with c/o HA, N/V, Chest pain, and dizziness, per initial ED note, pt reported it was lightheadedness without room spinning, but pt currently endorsing spinning dizziness. While in the ED noted to have right arm and leg drift, but this fluctuated and at one point had bilateral leg drift. HA became more intense in ED. She was seen by Dr. Augustina Vasquez who noted right LE drift. Suspected complicated migraine related, but MRI brain ordered to fully exclude stroke. MRI brain is normal.   HgbA1C 5.8, .2. EKG with NSR. Exam with BMI 32, o/w unremarkable. Complicated migraine  Plan:   -Continue ASA 81mg daily for prevention of migraine related stroke  -Continue Lipitor 10mg daily given LDL>100  -F/u with PCP for management of prediabetes  -F/u with Dr. Augustina Vasquez or his NP for management of complicated migraines, message sent to schedulers. Subjective:   Pt is tired and has had a HA off and on all day.      Objective:   Chart reviewed since last seen    Current Facility-Administered Medications   Medication Dose Route Frequency    atorvastatin (LIPITOR) tablet 10 mg  10 mg Oral Nightly    sodium chloride flush 0.9 % injection 5-40 mL  5-40 mL IntraVENous 2 times per day    sodium chloride flush 0.9 % injection 5-40 mL  5-40 mL IntraVENous PRN    0.9 % sodium chloride infusion   IntraVENous PRN    ondansetron (ZOFRAN-ODT) disintegrating tablet 4 mg  4 mg Oral Q8H PRN    Or

## 2023-10-14 NOTE — PROGRESS NOTES
10/14/2023        RE: [address] [id number]          To Whom It May Concern,      Due to medical reasons, Shasha Dyer may  may return to work on 10/17/23          Sincerely,              Teto Daniel MD   Internal [address]  [phone]

## 2023-10-15 NOTE — DISCHARGE SUMMARY
This patient: Meets criteria for obesity given BMI >/= 30 and < 40 due to excess calories/nutritional. Weight loss and lifestyle modifications should be encouraged as an outpatient. PENDING TEST RESULTS:   At the time of discharge the following test results are still pending: None     ADDITIONAL CARE RECOMMENDATIONS:     Follow-up with PCP in 1 to 2 weeks. DIET: cardiac diet    ACTIVITY: activity as tolerated    EQUIPMENT needed: None    NOTIFY YOUR PHYSICIAN FOR ANY OF THE FOLLOWING:   Fever over 101 degrees for 24 hours. Chest pain, shortness of breath, fever, chills, nausea, vomiting, diarrhea, change in mentation, falling, weakness, bleeding. Severe pain or pain not relieved by medications, as well as any other signs or symptoms that you may have questions about.     FOLLOW UP APPOINTMENTS:    [unfilled]     DISCHARGE MEDICATIONS:     Medication List        START taking these medications      aspirin 81 MG chewable tablet  Take 1 tablet by mouth daily     atorvastatin 10 MG tablet  Commonly known as: LIPITOR  Take 1 tablet by mouth nightly            CONTINUE taking these medications      acetaminophen 325 MG tablet  Commonly known as: TYLENOL     clindamycin 300 MG capsule  Commonly known as: CLEOCIN     ibuprofen 800 MG tablet  Commonly known as: ADVIL;MOTRIN     ondansetron 8 MG Tbdp disintegrating tablet  Commonly known as: ZOFRAN-ODT     pantoprazole 40 MG tablet  Commonly known as: PROTONIX               Where to Get Your Medications        These medications were sent to [address] [address]      Phone: [phone]   aspirin 81 MG chewable tablet  atorvastatin 10 MG tablet         DISPOSITION:  *  Home With:   OT  PT  HH  RN       Long term SNF/Inpatient Rehab    Independent/assisted living    Hospice    Other:     PATIENT CONDITION AT DISCHARGE:     Functional status    Poor

## 2023-10-20 ENCOUNTER — TELEPHONE (OUTPATIENT)
Age: 43
End: 2023-10-20

## 2023-10-20 NOTE — TELEPHONE ENCOUNTER
Called the patient to schedule hospital follow up and she answered the phone and she asked me if we can call her back on her ceratin time , so I gave her our phone number to call us whenever she has her free time.

## 2023-10-26 ENCOUNTER — TELEPHONE (OUTPATIENT)
Age: 43
End: 2023-10-26

## 2023-10-26 NOTE — TELEPHONE ENCOUNTER
----- Message from Yadira Kebede MD sent at 10/14/2023  5:19 PM EDT -----  Regarding: Hospital f/u  Pt needs f/u with Dr. Hiral Manley or Pat eKlly for management of complicated migraines.

## 2024-08-17 ENCOUNTER — APPOINTMENT (OUTPATIENT)
Facility: HOSPITAL | Age: 44
End: 2024-08-17
Payer: MEDICAID

## 2024-08-17 ENCOUNTER — HOSPITAL ENCOUNTER (EMERGENCY)
Facility: HOSPITAL | Age: 44
Discharge: HOME | End: 2024-08-17
Attending: EMERGENCY MEDICINE
Payer: MEDICAID

## 2024-08-17 VITALS
BODY MASS INDEX: 31.72 KG/M2 | HEART RATE: 78 BPM | SYSTOLIC BLOOD PRESSURE: 132 MMHG | TEMPERATURE: 97.9 F | DIASTOLIC BLOOD PRESSURE: 84 MMHG | HEIGHT: 61 IN | WEIGHT: 168 LBS | OXYGEN SATURATION: 97 % | RESPIRATION RATE: 20 BRPM

## 2024-08-17 DIAGNOSIS — R07.9 CHEST PAIN, UNSPECIFIED TYPE: Primary | ICD-10-CM

## 2024-08-17 DIAGNOSIS — F43.21 GRIEF REACTION: ICD-10-CM

## 2024-08-17 LAB
ALBUMIN SERPL-MCNC: 4 G/DL (ref 3.5–5.2)
ALBUMIN/GLOB SERPL: 1.4 (ref 1.1–2.2)
ALP SERPL-CCNC: 88 U/L (ref 35–104)
ALT SERPL-CCNC: 13 U/L (ref 10–35)
ANION GAP SERPL CALC-SCNC: 9 MMOL/L (ref 5–15)
AST SERPL-CCNC: 27 U/L (ref 10–35)
BASOPHILS # BLD: 0.1 K/UL (ref 0–1)
BASOPHILS NFR BLD: 1 % (ref 0–1)
BILIRUB SERPL-MCNC: 0.4 MG/DL (ref 0.2–1)
BUN SERPL-MCNC: 8 MG/DL (ref 6–20)
BUN/CREAT SERPL: 14 (ref 12–20)
CALCIUM SERPL-MCNC: 8.9 MG/DL (ref 8.6–10)
CHLORIDE SERPL-SCNC: 106 MMOL/L (ref 98–107)
CO2 SERPL-SCNC: 23 MMOL/L (ref 22–29)
CREAT SERPL-MCNC: 0.56 MG/DL (ref 0.5–0.9)
DIFFERENTIAL METHOD BLD: ABNORMAL
EKG ATRIAL RATE: 82 BPM
EKG DIAGNOSIS: NORMAL
EKG P AXIS: 57 DEGREES
EKG P-R INTERVAL: 126 MS
EKG Q-T INTERVAL: 384 MS
EKG QRS DURATION: 80 MS
EKG QTC CALCULATION (BAZETT): 448 MS
EKG R AXIS: 17 DEGREES
EKG T AXIS: 16 DEGREES
EKG VENTRICULAR RATE: 82 BPM
EOSINOPHIL # BLD: 0.2 K/UL (ref 0–0.4)
EOSINOPHIL NFR BLD: 3 %
ERYTHROCYTE [DISTWIDTH] IN BLOOD BY AUTOMATED COUNT: 14.4 % (ref 11.5–14.5)
GLOBULIN SER CALC-MCNC: 2.8 G/DL (ref 2–4)
GLUCOSE SERPL-MCNC: 110 MG/DL (ref 65–100)
HCG SERPL-ACNC: <1 MIU/ML
HCT VFR BLD AUTO: 41.1 % (ref 35–47)
HGB BLD-MCNC: 13.4 G/DL (ref 11.5–16)
IMM GRANULOCYTES # BLD AUTO: 0 K/UL (ref 0–0.04)
IMM GRANULOCYTES NFR BLD AUTO: 0 % (ref 0–0.5)
LYMPHOCYTES # BLD: 3.4 K/UL (ref 0.8–3.5)
LYMPHOCYTES NFR BLD: 44 % (ref 12–49)
MCH RBC QN AUTO: 25.7 PG (ref 26–34)
MCHC RBC AUTO-ENTMCNC: 32.6 G/DL (ref 30–36.5)
MCV RBC AUTO: 78.9 FL (ref 80–99)
MONOCYTES # BLD: 0.4 K/UL (ref 0–1)
MONOCYTES NFR BLD: 5 % (ref 5–13)
NEUTS SEG # BLD: 3.6 K/UL (ref 1.8–8)
NEUTS SEG NFR BLD: 47 % (ref 32–75)
NRBC # BLD: 0 K/UL (ref 0–0.01)
NRBC BLD-RTO: 0 PER 100 WBC
PLATELET # BLD AUTO: 371 K/UL (ref 150–400)
PMV BLD AUTO: 10 FL (ref 8.9–12.9)
POTASSIUM SERPL-SCNC: 3.6 MMOL/L (ref 3.5–5.1)
PROT SERPL-MCNC: 6.8 G/DL (ref 6.4–8.3)
RBC # BLD AUTO: 5.21 M/UL (ref 3.8–5.2)
SODIUM SERPL-SCNC: 138 MMOL/L (ref 136–145)
TROPONIN T SERPL HS-MCNC: <6 NG/L (ref 0–14)
WBC # BLD AUTO: 7.6 K/UL (ref 3.6–11)

## 2024-08-17 PROCEDURE — 85025 COMPLETE CBC W/AUTO DIFF WBC: CPT

## 2024-08-17 PROCEDURE — 96374 THER/PROPH/DIAG INJ IV PUSH: CPT

## 2024-08-17 PROCEDURE — 93010 ELECTROCARDIOGRAM REPORT: CPT | Performed by: INTERNAL MEDICINE

## 2024-08-17 PROCEDURE — 36415 COLL VENOUS BLD VENIPUNCTURE: CPT

## 2024-08-17 PROCEDURE — 80053 COMPREHEN METABOLIC PANEL: CPT

## 2024-08-17 PROCEDURE — 84484 ASSAY OF TROPONIN QUANT: CPT

## 2024-08-17 PROCEDURE — 6360000002 HC RX W HCPCS: Performed by: EMERGENCY MEDICINE

## 2024-08-17 PROCEDURE — 84702 CHORIONIC GONADOTROPIN TEST: CPT

## 2024-08-17 PROCEDURE — 71045 X-RAY EXAM CHEST 1 VIEW: CPT

## 2024-08-17 PROCEDURE — 6370000000 HC RX 637 (ALT 250 FOR IP): Performed by: EMERGENCY MEDICINE

## 2024-08-17 PROCEDURE — 99285 EMERGENCY DEPT VISIT HI MDM: CPT

## 2024-08-17 PROCEDURE — 93005 ELECTROCARDIOGRAM TRACING: CPT | Performed by: EMERGENCY MEDICINE

## 2024-08-17 RX ORDER — DIAZEPAM 5 MG/ML
5 INJECTION, SOLUTION INTRAMUSCULAR; INTRAVENOUS ONCE
Status: COMPLETED | OUTPATIENT
Start: 2024-08-17 | End: 2024-08-17

## 2024-08-17 RX ORDER — DIAZEPAM 5 MG/1
5 TABLET ORAL EVERY 6 HOURS PRN
Qty: 12 TABLET | Refills: 0 | Status: SHIPPED | OUTPATIENT
Start: 2024-08-17 | End: 2024-08-27

## 2024-08-17 RX ORDER — POLYMYXIN B SULFATE AND TRIMETHOPRIM 1; 10000 MG/ML; [USP'U]/ML
1 SOLUTION OPHTHALMIC EVERY 4 HOURS
Qty: 2 ML | Refills: 0 | Status: SHIPPED | OUTPATIENT
Start: 2024-08-17 | End: 2024-08-22

## 2024-08-17 RX ORDER — TETRACAINE HYDROCHLORIDE 5 MG/ML
1 SOLUTION OPHTHALMIC ONCE
Status: COMPLETED | OUTPATIENT
Start: 2024-08-17 | End: 2024-08-17

## 2024-08-17 RX ADMIN — FLUORESCEIN SODIUM 1 MG: 1 STRIP OPHTHALMIC at 03:41

## 2024-08-17 RX ADMIN — DIAZEPAM 5 MG: 5 INJECTION INTRAMUSCULAR; INTRAVENOUS at 02:38

## 2024-08-17 RX ADMIN — TETRACAINE HYDROCHLORIDE 1 DROP: 5 SOLUTION OPHTHALMIC at 03:41

## 2024-08-17 ASSESSMENT — LIFESTYLE VARIABLES
HOW MANY STANDARD DRINKS CONTAINING ALCOHOL DO YOU HAVE ON A TYPICAL DAY: PATIENT DOES NOT DRINK
HOW OFTEN DO YOU HAVE A DRINK CONTAINING ALCOHOL: NEVER

## 2024-08-17 ASSESSMENT — PAIN DESCRIPTION - LOCATION: LOCATION: CHEST

## 2024-08-17 ASSESSMENT — ENCOUNTER SYMPTOMS
EYES NEGATIVE: 1
NAUSEA: 1
SHORTNESS OF BREATH: 1

## 2024-08-17 ASSESSMENT — PAIN SCALES - GENERAL: PAINLEVEL_OUTOF10: 7

## 2024-08-17 ASSESSMENT — PAIN - FUNCTIONAL ASSESSMENT: PAIN_FUNCTIONAL_ASSESSMENT: 0-10

## 2024-08-17 NOTE — ED TRIAGE NOTES
Pt presents to ED with c/o mid center CP that started yesterday when she got the news that her boyfriend . Pt reports recent grief with fiance death. Pt c/o nausea and vomiting x1, denies SOB, no dizziness. Pt crying during triage.

## 2024-08-17 NOTE — DISCHARGE INSTRUCTIONS
You were seen in the emergency department for chest pain.  The results of your tests were reassuring.  Although an exact cause of your symptoms was not identified, the most likely cause is a grief reaction.  Please take any medications prescribed at this visit as instructed.  Please follow-up with your PCP or return to the emergency department if you experience a worsening of symptoms or any new symptoms that are concerning to you.

## 2024-08-17 NOTE — ED PROVIDER NOTES
[facility] EMERGENCY DEPT  EMERGENCY DEPARTMENT ENCOUNTER      Pt Name: Katy Hunt  MRN: [id number]  Birthdate 1980  Date of evaluation: 2024  Provider: Parish Oneal MD    CHIEF COMPLAINT       Chief Complaint   Patient presents with    Chest Pain    Panic Attack         HISTORY OF PRESENT ILLNESS   (Location/Symptom, Timing/Onset, Context/Setting, Quality, Duration, Modifying Factors, Severity)  Note limiting factors.   42-year-old female with no pertinent medical history presents to the emergency department complaining of midsternal chest pain, with associated nausea, headache and shortness of breath, since yesterday in the context of her boyfriend dying of a reported myocardial infarction.  She has no additional complaints at this time    The history is provided by the patient.         Review of External Medical Records:     Nursing Notes were reviewed.    REVIEW OF SYSTEMS    (2-9 systems for level 4, 10 or more for level 5)     Review of Systems   Constitutional: Negative.    HENT: Negative.     Eyes: Negative.    Respiratory:  Positive for shortness of breath.    Cardiovascular:  Positive for chest pain.   Gastrointestinal:  Positive for nausea.   Genitourinary: Negative.    Musculoskeletal: Negative.    Skin: Negative.    Neurological:  Positive for headaches.   Psychiatric/Behavioral: Negative.         Except as noted above the remainder of the review of systems was reviewed and negative.       PAST MEDICAL HISTORY     Past Medical History:   Diagnosis Date    Abnormal Papanicolaou smear of cervix     Chlamydia     during \"my early twenties\"    Complicated migraine 10/13/2023    Genital herpes     last outbreak 3-4 weeks ago    Headache     Herpes gestationis     Other unknown and unspecified cause of morbidity or mortality     Migraine    Psychiatric problem     depression during this pregnancy         SURGICAL HISTORY       Past Surgical History:   Procedure Laterality Date      normal limits   CBC WITH AUTO DIFFERENTIAL - Abnormal; Notable for the following components:    RBC 5.21 (*)     MCV 78.9 (*)     MCH 25.7 (*)     Eosinophils % 3 (*)     All other components within normal limits   TROPONIN T   HCG, QUANTITATIVE, PREGNANCY   EXTRA TUBES HOLD       All other labs were within normal range or not returned as of this dictation.    EMERGENCY DEPARTMENT COURSE and DIFFERENTIAL DIAGNOSIS/MDM:   Vitals:    Vitals:    08/17/24 0213 08/17/24 0232 08/17/24 0302   BP: (!) 151/106 133/61 126/75   Pulse: 78     Resp: 20     Temp: 97.9 °F (36.6 °C)     TempSrc: Oral     SpO2: 97% 97% 96%   Weight: 76.2 kg (168 lb)     Height: 1.549 m (5' 1\")             Medical Decision Making  DDx: ACS, pericarditis, myocarditis, musculoskeletal pain, anxiety    PERC negative, low concern for PE    Plan:  - EKG  - Labs: BMP, CBC, troponin  - Imaging: CXR  - Medications: Diazepam    Reassessment: Patient reports significant improvement with the diazepam.  EKG is reassuring.  Awaiting results of labs and chest x-ray.    Reassessment: Patient's lab work and chest x-ray are reassuring.  Suspect she is having a grief reaction of the loss of her significant other.  Will prescribe her a few doses of diazepam.  She may safely be discharged at this time with recommendation to follow-up with the primary care provider or return to the emergency department for new or worsening symptoms.    Reassessment: As I was discharging the patient, she started complaining of pain around the right eye.  Tetracaine and fluorescein were administered and the eye was examined with a Woods lamp, no fluorescein uptake appreciated.  Patient may have a viral conjunctivitis.  Will prescribe antibiotic eyedrops.  She may safely be discharged    Amount and/or Complexity of Data Reviewed  Labs: ordered.  Radiology: ordered.  ECG/medicine tests: ordered.    Risk  Prescription drug management.  Parenteral controlled substances.            REASSESSMENT

## 2025-03-13 ENCOUNTER — APPOINTMENT (OUTPATIENT)
Facility: HOSPITAL | Age: 45
End: 2025-03-13

## 2025-03-13 ENCOUNTER — HOSPITAL ENCOUNTER (EMERGENCY)
Facility: HOSPITAL | Age: 45
Discharge: HOME | End: 2025-03-13
Attending: STUDENT IN AN ORGANIZED HEALTH CARE EDUCATION/TRAINING PROGRAM

## 2025-03-13 VITALS
DIASTOLIC BLOOD PRESSURE: 75 MMHG | BODY MASS INDEX: 31.72 KG/M2 | WEIGHT: 168 LBS | HEART RATE: 85 BPM | HEIGHT: 61 IN | SYSTOLIC BLOOD PRESSURE: 124 MMHG | RESPIRATION RATE: 18 BRPM | OXYGEN SATURATION: 98 % | TEMPERATURE: 98.1 F

## 2025-03-13 DIAGNOSIS — R09.81 NASAL CONGESTION: ICD-10-CM

## 2025-03-13 DIAGNOSIS — J06.9 ACUTE UPPER RESPIRATORY INFECTION: Primary | ICD-10-CM

## 2025-03-13 DIAGNOSIS — R05.1 ACUTE COUGH: ICD-10-CM

## 2025-03-13 LAB
FLUAV RNA SPEC QL NAA+PROBE: NOT DETECTED
FLUBV RNA SPEC QL NAA+PROBE: NOT DETECTED
SARS-COV-2 RNA RESP QL NAA+PROBE: NOT DETECTED
SOURCE: NORMAL

## 2025-03-13 PROCEDURE — 87636 SARSCOV2 & INF A&B AMP PRB: CPT

## 2025-03-13 PROCEDURE — 99285 EMERGENCY DEPT VISIT HI MDM: CPT

## 2025-03-13 PROCEDURE — 93005 ELECTROCARDIOGRAM TRACING: CPT | Performed by: STUDENT IN AN ORGANIZED HEALTH CARE EDUCATION/TRAINING PROGRAM

## 2025-03-13 PROCEDURE — 71046 X-RAY EXAM CHEST 2 VIEWS: CPT

## 2025-03-13 RX ORDER — ALBUTEROL SULFATE 90 UG/1
2 INHALANT RESPIRATORY (INHALATION) 4 TIMES DAILY PRN
Qty: 54 G | Refills: 1 | Status: SHIPPED | OUTPATIENT
Start: 2025-03-13

## 2025-03-13 ASSESSMENT — PAIN SCALES - GENERAL: PAINLEVEL_OUTOF10: 7

## 2025-03-13 ASSESSMENT — PAIN - FUNCTIONAL ASSESSMENT: PAIN_FUNCTIONAL_ASSESSMENT: 0-10

## 2025-03-13 NOTE — DISCHARGE INSTRUCTIONS
You presented to the ED with cough nasal congestion, nausea and feeling poorly for a few days.  Work appears reassuring.  X-ray shows no signs of pneumonia or pneumothorax.  Your vital signs are stable with no respiratory issues, specifically no high heart rate, high respiratory rate or low oxygen.  Most likely a viral illness causing upper respiratory symptoms.  Recommend Sudafed and sinus rinses for your nasal congestion, albuterol with spacer for bronchospasm and severe coughing fits, Cepacol cough lozenges and other over-the-counter cold and cough medication.

## 2025-03-13 NOTE — ED PROVIDER NOTES
EMERGENCY DEPARTMENT PHYSICIAN NOTE     Patient: Katy Hunt     Time of Service: 3/13/2025  2:11 PM     Chief complaint:   Chief Complaint   Patient presents with   • Shortness of Breath   • Cough   • Lightheadedness        HISTORY:  Patient is a 44 y.o. female who presents to the emergency department with complaints of cough, congestion, lightheadedness, intermittent shortness of breath for a few days.  Patient's vital signs are stable.  ABCs are intact.  No signs of respiratory distress.  Patient states she cannot breathe through her nose and is congested.  No chest pain.  Patient looks like she does not feel well but no acute distress      Past Medical History:   Diagnosis Date   • Abnormal Papanicolaou smear of cervix    • Chlamydia     during \"my early twenties\"   • Complicated migraine 10/13/2023   • Genital herpes     last outbreak 3-4 weeks ago   • Headache    • Herpes gestationis    • Other unknown and unspecified cause of morbidity or mortality     Migraine   • Psychiatric problem     depression during this pregnancy        Past Surgical History:   Procedure Laterality Date   •  DELIVERY ONLY         •  SECTION     • COLONOSCOPY N/A 3/29/2021    COLONOSCOPY/EGD   :- performed by Zoë Torres MD at [facility] ENDOSCOPY        Family History   Problem Relation Age of Onset   • Elevated Lipids Father    • Diabetes Maternal Aunt    • Hypertension Paternal Aunt    • Diabetes Paternal Grandfather    • Elevated Lipids Mother    • Hypertension Mother    • Hypertension Maternal Aunt    • Hypertension Father    • Diabetes Paternal Aunt         Social History     Socioeconomic History   • Marital status: Single     Spouse name: None   • Number of children: None   • Years of education: None   • Highest education level: None   Tobacco Use   • Smoking status: Former     Current packs/day: 0.00     Types: Cigarettes     Quit date: 2016     Years since quittin.4   • Smokeless tobacco:

## 2025-03-13 NOTE — ED NOTES
Pt given discharge instructions, patient education, 2 prescriptions and follow up information. Pt verbalizes understanding. All questions answered. Pt discharged to home in private vehicle, ambulatory. Pt A&Ox4, RA, pain controlled.

## 2025-03-13 NOTE — ED TRIAGE NOTES
PT ambulatory to ED with complaints of SOB that started last night, with lightheadedness and nausea with a cough that started 2 days ago.

## 2025-03-15 LAB
EKG ATRIAL RATE: 81 BPM
EKG DIAGNOSIS: NORMAL
EKG P AXIS: 65 DEGREES
EKG P-R INTERVAL: 134 MS
EKG Q-T INTERVAL: 366 MS
EKG QRS DURATION: 74 MS
EKG QTC CALCULATION (BAZETT): 425 MS
EKG R AXIS: 41 DEGREES
EKG T AXIS: 31 DEGREES
EKG VENTRICULAR RATE: 81 BPM

## 2025-03-15 PROCEDURE — 93010 ELECTROCARDIOGRAM REPORT: CPT | Performed by: SPECIALIST

## 2025-05-25 ENCOUNTER — HOSPITAL ENCOUNTER (EMERGENCY)
Facility: HOSPITAL | Age: 45
Discharge: HOME | End: 2025-05-25
Attending: EMERGENCY MEDICINE
Payer: MEDICAID

## 2025-05-25 VITALS
RESPIRATION RATE: 18 BRPM | HEIGHT: 61 IN | SYSTOLIC BLOOD PRESSURE: 126 MMHG | OXYGEN SATURATION: 99 % | BODY MASS INDEX: 32.1 KG/M2 | TEMPERATURE: 98.3 F | WEIGHT: 170 LBS | HEART RATE: 79 BPM | DIASTOLIC BLOOD PRESSURE: 94 MMHG

## 2025-05-25 DIAGNOSIS — H10.11 ALLERGIC CONJUNCTIVITIS OF RIGHT EYE: Primary | ICD-10-CM

## 2025-05-25 PROCEDURE — 99283 EMERGENCY DEPT VISIT LOW MDM: CPT

## 2025-05-25 RX ORDER — FLUTICASONE PROPIONATE 50 MCG
2 SPRAY, SUSPENSION (ML) NASAL DAILY
Qty: 16 G | Refills: 0 | Status: SHIPPED | OUTPATIENT
Start: 2025-05-25

## 2025-05-25 RX ORDER — CETIRIZINE HYDROCHLORIDE 10 MG/1
10 TABLET ORAL DAILY
Qty: 30 TABLET | Refills: 0 | Status: SHIPPED | OUTPATIENT
Start: 2025-05-25

## 2025-05-25 ASSESSMENT — PAIN SCALES - GENERAL: PAINLEVEL_OUTOF10: 2

## 2025-05-25 ASSESSMENT — PAIN - FUNCTIONAL ASSESSMENT: PAIN_FUNCTIONAL_ASSESSMENT: 0-10

## 2025-05-25 ASSESSMENT — PAIN DESCRIPTION - LOCATION: LOCATION: EYE

## 2025-05-25 NOTE — ED PROVIDER NOTES
[facility] EMERGENCY DEPARTMENT  EMERGENCY DEPARTMENT ENCOUNTER      Pt Name: Katy Hunt  MRN: [id number]  Birthdate 1980  Date of evaluation: 2025  Provider: Sagar Cronin MD      HISTORY OF PRESENT ILLNESS      44-year-old female with history of migraines, STI presents to the emergency department with a chief complaint of drainage and redness to her eyes, predominately the right eye.  Symptoms began this morning.  She has some blurriness which resolved with rubbing the eye.  Her symptoms have improved throughout the day.  No fevers and no Sick contacts.    The history is provided by the patient and medical records.           Nursing Notes were reviewed.    REVIEW OF SYSTEMS         Review of Systems        PAST MEDICAL HISTORY     Past Medical History:   Diagnosis Date    Abnormal Papanicolaou smear of cervix     Chlamydia     during \"my early twenties\"    Complicated migraine 10/13/2023    Genital herpes     last outbreak 3-4 weeks ago    Headache     Herpes gestationis     Other unknown and unspecified cause of morbidity or mortality     Migraine    Psychiatric problem     depression during this pregnancy         SURGICAL HISTORY       Past Surgical History:   Procedure Laterality Date     DELIVERY ONLY      2004     SECTION      COLONOSCOPY N/A 3/29/2021    COLONOSCOPY/EGD   :- performed by Zoë Torres MD at [facility] ENDOSCOPY         CURRENT MEDICATIONS       Previous Medications    ACETAMINOPHEN (TYLENOL) 325 MG TABLET    Take 1 tablet by mouth every 6 hours as needed    ALBUTEROL SULFATE HFA (VENTOLIN HFA) 108 (90 BASE) MCG/ACT INHALER    Inhale 2 puffs into the lungs 4 times daily as needed (cough)    ASPIRIN 81 MG CHEWABLE TABLET    Take 1 tablet by mouth daily    ATORVASTATIN (LIPITOR) 10 MG TABLET    Take 1 tablet by mouth nightly    CLINDAMYCIN (CLEOCIN) 300 MG CAPSULE    take 2 capsules by mouth FIRST STARTING DOSE then take 1 capsule three times a day until

## 2025-08-26 ENCOUNTER — HOSPITAL ENCOUNTER (EMERGENCY)
Facility: HOSPITAL | Age: 45
Discharge: HOME | End: 2025-08-26
Attending: EMERGENCY MEDICINE
Payer: MEDICAID

## 2025-08-26 VITALS
RESPIRATION RATE: 18 BRPM | HEIGHT: 61 IN | HEART RATE: 76 BPM | BODY MASS INDEX: 32.1 KG/M2 | OXYGEN SATURATION: 96 % | TEMPERATURE: 98.2 F | DIASTOLIC BLOOD PRESSURE: 68 MMHG | SYSTOLIC BLOOD PRESSURE: 120 MMHG | WEIGHT: 170 LBS

## 2025-08-26 DIAGNOSIS — G43.119 INTRACTABLE MIGRAINE WITH AURA WITHOUT STATUS MIGRAINOSUS: Primary | ICD-10-CM

## 2025-08-26 LAB
EKG ATRIAL RATE: 68 BPM
EKG DIAGNOSIS: NORMAL
EKG P AXIS: 64 DEGREES
EKG P-R INTERVAL: 142 MS
EKG Q-T INTERVAL: 380 MS
EKG QRS DURATION: 86 MS
EKG QTC CALCULATION (BAZETT): 404 MS
EKG R AXIS: 26 DEGREES
EKG T AXIS: 30 DEGREES
EKG VENTRICULAR RATE: 68 BPM
FLUAV RNA SPEC QL NAA+PROBE: NOT DETECTED
FLUBV RNA SPEC QL NAA+PROBE: NOT DETECTED
SARS-COV-2 RNA RESP QL NAA+PROBE: NOT DETECTED
SOURCE: NORMAL

## 2025-08-26 PROCEDURE — 93010 ELECTROCARDIOGRAM REPORT: CPT | Performed by: SPECIALIST

## 2025-08-26 PROCEDURE — 96372 THER/PROPH/DIAG INJ SC/IM: CPT

## 2025-08-26 PROCEDURE — 6360000002 HC RX W HCPCS

## 2025-08-26 PROCEDURE — 87636 SARSCOV2 & INF A&B AMP PRB: CPT

## 2025-08-26 PROCEDURE — 99284 EMERGENCY DEPT VISIT MOD MDM: CPT

## 2025-08-26 PROCEDURE — 93005 ELECTROCARDIOGRAM TRACING: CPT | Performed by: EMERGENCY MEDICINE

## 2025-08-26 PROCEDURE — 6370000000 HC RX 637 (ALT 250 FOR IP)

## 2025-08-26 RX ORDER — ONDANSETRON 4 MG/1
4 TABLET, FILM COATED ORAL 3 TIMES DAILY PRN
Qty: 20 TABLET | Refills: 0 | Status: SHIPPED | OUTPATIENT
Start: 2025-08-26 | End: 2025-09-05

## 2025-08-26 RX ORDER — ONDANSETRON 4 MG/1
4 TABLET, ORALLY DISINTEGRATING ORAL
Status: COMPLETED | OUTPATIENT
Start: 2025-08-26 | End: 2025-08-26

## 2025-08-26 RX ORDER — DIPHENHYDRAMINE HCL 25 MG
25 CAPSULE ORAL ONCE
Status: COMPLETED | OUTPATIENT
Start: 2025-08-26 | End: 2025-08-26

## 2025-08-26 RX ORDER — MAGNESIUM OXIDE 400 MG/1
400 TABLET ORAL DAILY
Qty: 30 TABLET | Refills: 0 | Status: SHIPPED | OUTPATIENT
Start: 2025-08-26

## 2025-08-26 RX ORDER — LANOLIN ALCOHOL/MO/W.PET/CERES
400 CREAM (GRAM) TOPICAL ONCE
Status: COMPLETED | OUTPATIENT
Start: 2025-08-26 | End: 2025-08-26

## 2025-08-26 RX ORDER — DIPHENHYDRAMINE HCL 25 MG
25 CAPSULE ORAL EVERY 6 HOURS PRN
Qty: 30 CAPSULE | Refills: 0 | Status: SHIPPED | OUTPATIENT
Start: 2025-08-26 | End: 2025-09-05

## 2025-08-26 RX ORDER — KETOROLAC TROMETHAMINE 30 MG/ML
30 INJECTION, SOLUTION INTRAMUSCULAR; INTRAVENOUS
Status: COMPLETED | OUTPATIENT
Start: 2025-08-26 | End: 2025-08-26

## 2025-08-26 RX ORDER — DICLOFENAC SODIUM 75 MG/1
75 TABLET, DELAYED RELEASE ORAL 2 TIMES DAILY PRN
Qty: 20 TABLET | Refills: 0 | Status: SHIPPED | OUTPATIENT
Start: 2025-08-26

## 2025-08-26 RX ADMIN — KETOROLAC TROMETHAMINE 30 MG: 30 INJECTION, SOLUTION INTRAMUSCULAR at 11:31

## 2025-08-26 RX ADMIN — Medication 400 MG: at 11:30

## 2025-08-26 RX ADMIN — DIPHENHYDRAMINE HYDROCHLORIDE 25 MG: 25 CAPSULE ORAL at 11:31

## 2025-08-26 RX ADMIN — ONDANSETRON 4 MG: 4 TABLET, ORALLY DISINTEGRATING ORAL at 11:30

## 2025-08-26 ASSESSMENT — ENCOUNTER SYMPTOMS
DIARRHEA: 1
ABDOMINAL PAIN: 1
NAUSEA: 1
VOMITING: 1

## 2025-08-26 ASSESSMENT — LIFESTYLE VARIABLES
HOW OFTEN DO YOU HAVE A DRINK CONTAINING ALCOHOL: NEVER
HOW MANY STANDARD DRINKS CONTAINING ALCOHOL DO YOU HAVE ON A TYPICAL DAY: PATIENT DOES NOT DRINK

## 2025-08-26 ASSESSMENT — PAIN DESCRIPTION - FREQUENCY: FREQUENCY: CONTINUOUS

## 2025-08-26 ASSESSMENT — PAIN - FUNCTIONAL ASSESSMENT
PAIN_FUNCTIONAL_ASSESSMENT: ACTIVITIES ARE NOT PREVENTED
PAIN_FUNCTIONAL_ASSESSMENT: 0-10
PAIN_FUNCTIONAL_ASSESSMENT: 0-10

## 2025-08-26 ASSESSMENT — PAIN DESCRIPTION - LOCATION
LOCATION: HEAD
LOCATION: HEAD

## 2025-08-26 ASSESSMENT — PAIN SCALES - GENERAL
PAINLEVEL_OUTOF10: 9
PAINLEVEL_OUTOF10: 9

## 2025-08-26 ASSESSMENT — PAIN DESCRIPTION - DESCRIPTORS: DESCRIPTORS: ACHING

## 2025-08-26 ASSESSMENT — PAIN DESCRIPTION - ORIENTATION: ORIENTATION: RIGHT

## 2025-08-26 ASSESSMENT — PAIN DESCRIPTION - PAIN TYPE: TYPE: ACUTE PAIN

## (undated) DEVICE — (D)PREP SKN CHLRAPRP APPL 26ML -- CONVERT TO ITEM 371833

## (undated) DEVICE — SOLUTION IRRIG 1000ML H2O STRL BLT

## (undated) DEVICE — DEVON™ KNEE AND BODY STRAP 60" X 3" (1.5 M X 7.6 CM): Brand: DEVON

## (undated) DEVICE — DRAPE FLD WRM W44XL66IN C6L FOR INTRATEMP SYS THERMABASIN

## (undated) DEVICE — STERILE POLYISOPRENE POWDER-FREE SURGICAL GLOVES: Brand: PROTEXIS

## (undated) DEVICE — REM POLYHESIVE ADULT PATIENT RETURN ELECTRODE: Brand: VALLEYLAB

## (undated) DEVICE — CATH FOLEY 16F LUBRI-SIL IC --

## (undated) DEVICE — FORCEPS BX L240CM JAW DIA2.8MM L CAP W/ NDL MIC MESH TOOTH

## (undated) DEVICE — SUTURE VCRL SZ 0 L36IN ABSRB UD L40MM CT 1/2 CIR TAPERPOINT J958H

## (undated) DEVICE — TUBING HYDR IRR --

## (undated) DEVICE — Z DUP USE 2227076 BARRIER ADH TISS 4-SECTION SEPRAFILM

## (undated) DEVICE — ROYALSILK SURGICAL GOWN, L: Brand: CONVERTORS

## (undated) DEVICE — 3000CC GUARDIAN II: Brand: GUARDIAN

## (undated) DEVICE — KENDALL SCD EXPRESS SLEEVES, KNEE LENGTH, MEDIUM: Brand: KENDALL SCD

## (undated) DEVICE — COVERALL PREM SMS 2XL KNIT --

## (undated) DEVICE — SOLUTION IV 1000ML 0.9% SOD CHL

## (undated) DEVICE — SPONGE: LAP 18X18 W  200/CS: Brand: MEDICAL ACTION INDUSTRIES

## (undated) DEVICE — MEDI-VAC NON-CONDUCTIVE SUCTION TUBING: Brand: CARDINAL HEALTH

## (undated) DEVICE — ROYAL SILK SURGICAL GOWN, XXL: Brand: CONVERTORS

## (undated) DEVICE — PACK PROCEDURE SURG C SECT KT SMH

## (undated) DEVICE — LIGHT HANDLE: Brand: DEVON

## (undated) DEVICE — SOLIDIFIER MEDC 1200ML -- CONVERT TO 356117

## (undated) DEVICE — DRSG AQUACEL SURG 3.5 X 10IN -- CONVERT TO ITEM 370183